# Patient Record
Sex: MALE | Race: WHITE | Employment: FULL TIME | URBAN - NONMETROPOLITAN AREA
[De-identification: names, ages, dates, MRNs, and addresses within clinical notes are randomized per-mention and may not be internally consistent; named-entity substitution may affect disease eponyms.]

---

## 2017-09-30 PROBLEM — I26.99 PULMONARY EMBOLISM (HCC): Status: ACTIVE | Noted: 2017-09-30

## 2017-10-04 ENCOUNTER — TELEPHONE (OUTPATIENT)
Dept: INTERNAL MEDICINE CLINIC | Age: 62
End: 2017-10-04

## 2017-10-10 ENCOUNTER — OFFICE VISIT (OUTPATIENT)
Dept: INTERNAL MEDICINE CLINIC | Age: 62
End: 2017-10-10

## 2017-10-10 VITALS
TEMPERATURE: 98.6 F | HEART RATE: 72 BPM | BODY MASS INDEX: 31.55 KG/M2 | OXYGEN SATURATION: 97 % | HEIGHT: 69 IN | RESPIRATION RATE: 16 BRPM | SYSTOLIC BLOOD PRESSURE: 138 MMHG | WEIGHT: 213 LBS | DIASTOLIC BLOOD PRESSURE: 82 MMHG

## 2017-10-10 DIAGNOSIS — R97.20 ELEVATED PSA: ICD-10-CM

## 2017-10-10 DIAGNOSIS — I26.99 OTHER PULMONARY EMBOLISM WITHOUT ACUTE COR PULMONALE, UNSPECIFIED CHRONICITY (HCC): Primary | ICD-10-CM

## 2017-10-10 DIAGNOSIS — R91.1 PULMONARY NODULE, RIGHT: ICD-10-CM

## 2017-10-10 PROCEDURE — 99214 OFFICE O/P EST MOD 30 MIN: CPT | Performed by: INTERNAL MEDICINE

## 2017-10-10 ASSESSMENT — PATIENT HEALTH QUESTIONNAIRE - PHQ9
SUM OF ALL RESPONSES TO PHQ9 QUESTIONS 1 & 2: 0
1. LITTLE INTEREST OR PLEASURE IN DOING THINGS: 0
2. FEELING DOWN, DEPRESSED OR HOPELESS: 0
SUM OF ALL RESPONSES TO PHQ QUESTIONS 1-9: 0

## 2017-10-16 ASSESSMENT — ENCOUNTER SYMPTOMS
COUGH: 0
EYES NEGATIVE: 1
SHORTNESS OF BREATH: 1

## 2017-11-14 ENCOUNTER — OFFICE VISIT (OUTPATIENT)
Dept: INTERNAL MEDICINE CLINIC | Age: 62
End: 2017-11-14

## 2017-11-14 VITALS
HEART RATE: 72 BPM | BODY MASS INDEX: 31.96 KG/M2 | TEMPERATURE: 98.1 F | RESPIRATION RATE: 11 BRPM | OXYGEN SATURATION: 97 % | WEIGHT: 215.8 LBS | SYSTOLIC BLOOD PRESSURE: 128 MMHG | DIASTOLIC BLOOD PRESSURE: 72 MMHG | HEIGHT: 69 IN

## 2017-11-14 DIAGNOSIS — R79.1 ELEVATED FACTOR VIII LEVEL: ICD-10-CM

## 2017-11-14 DIAGNOSIS — R91.1 LUNG NODULE, SOLITARY: ICD-10-CM

## 2017-11-14 DIAGNOSIS — R97.20 ELEVATED PSA: Primary | ICD-10-CM

## 2017-11-14 DIAGNOSIS — I26.99 OTHER PULMONARY EMBOLISM WITHOUT ACUTE COR PULMONALE, UNSPECIFIED CHRONICITY (HCC): ICD-10-CM

## 2017-11-14 PROCEDURE — 99213 OFFICE O/P EST LOW 20 MIN: CPT | Performed by: INTERNAL MEDICINE

## 2017-11-14 NOTE — PATIENT INSTRUCTIONS
Patient Education        Meralgia Paresthetica: Care Instructions  Your Care Instructions  Meralgia paresthetica (say \"muh-RAL-juh nut-fuo-INGX-ick-uh\") is pain and numbness in the outer part of your thigh. The pain might get worse after you walk or stand for a long time. This pain and numbness occur when a nerve in your thigh is pinched (compressed). Sometimes the problem is caused by wearing tight clothing or being overweight. Most of the time the problem goes away on its own in a few months. Lowering any pressure on the thigh area may help. Wear loose clothes, and lose weight if you need to. Follow-up care is a key part of your treatment and safety. Be sure to make and go to all appointments, and call your doctor if you are having problems. It's also a good idea to know your test results and keep a list of the medicines you take. How can you care for yourself at home? · Most times the problem gets better on its own. Try wearing loose clothing to see if this helps. · Lose weight if you need to. Talk with your doctor if you need help. When should you call for help? Watch closely for changes in your health, and be sure to contact your doctor if:  · You have new symptoms, such as pain that gets worse or new numbness in your thigh. · You do not get better as expected. Where can you learn more? Go to [email or web address].[email or web address] org and sign in to your [employer] account. Enter J621 in the [facility] box to learn more about \"Meralgia Paresthetica: Care Instructions. \"     If you do not have an account, please click on the \"Sign Up Now\" link. Current as of: October 14, 2016  Content Version: 11.3  © 9850-4499 [employer]. Care instructions adapted under license by [facility] ([facility]).  If you have questions about a medical condition or this instruction, always ask your healthcare professional. [address]  any warranty or liability for your use of this

## 2017-11-20 PROBLEM — R79.1 ELEVATED FACTOR VIII LEVEL: Status: ACTIVE | Noted: 2017-11-20

## 2017-11-21 ASSESSMENT — ENCOUNTER SYMPTOMS
ORTHOPNEA: 0
GASTROINTESTINAL NEGATIVE: 1
RESPIRATORY NEGATIVE: 1
EYES NEGATIVE: 1

## 2017-11-21 NOTE — PROGRESS NOTES
Orlando More  Patient's  is 1955  Seen in office on 2017      SUBJECTIVE:  Jacob Graves is a 65 y. o.year old male presents today   Chief Complaint   Patient presents with    Pulmonary Embolism    Other     burning sensation RLE lateral worse as day progresses    Other     dry peeling L foot and fingertips     Pt has PE unprovoked pt is taking Xarelto   No bleeding or bruising. No chest pain  No SOB. No headache. No NVD. Pt has dry peeling of the skin of foot and finger tips  No itching. Pt had blood test : PSA is elevated. No urinary symptoms. Taking medications regularly. No side effects noted. Hypercoagulation workup:  -Factor VIII activity 319 and normal 50150)  -Prothrombin gene mutation negative  -Phosphatidylserine antibodies : neg  -MTHFR mutation C677T : Heterozygous  -MTHFR mutation P5291D negative  -Factor 5 Leiden : Negative  -Cardiolipin antibodies IgG and IgM were 5 and 4 respectively and anticardiolipin IgA was 4  Homocysteine was normal  Review of Systems   Constitutional: Negative. HENT: Negative. Eyes: Negative. Respiratory: Negative. Cardiovascular: Negative. Negative for chest pain, palpitations and orthopnea. Gastrointestinal: Negative. Genitourinary: Negative. Musculoskeletal: Negative. Skin: Negative. Neurological: Negative. Endo/Heme/Allergies: Negative. Psychiatric/Behavioral: Negative. OBJECTIVE: /72 (Site: Left Arm, Position: Sitting)   Pulse 72   Temp 98.1 °F (36.7 °C)   Resp 11   Ht 5' 9\" (1.753 m) Comment: w shoes  Wt 215 lb 12.8 oz (97.9 kg)   SpO2 97%   BMI 31.87 kg/m²     Wt Readings from Last 3 Encounters:   17 215 lb 12.8 oz (97.9 kg)   10/10/17 213 lb (96.6 kg)   17 210 lb (95.3 kg)      GENERAL:  Alert, oriented, pleasant, in no apparent distress. HEENT:  Conjunctiva pink, no scleral icterus. ENT clear. NECK:  Supple. No jugular venous distention noted.  No masses felt,  CARDIOVASCULAR:  Normal S1 and S2    PULMONARY:  No respiratory distress. No wheezes or rales. ABDOMEN:  Soft and non-tender,no masses  or organomegaly. EXTREMITIES:  No cyanosis, clubbing, or significant edema. SKIN: Skin is warm and dry. NEUROLOGICAL:  Cranial nerves II through XII are grossly intact. IMPRESSION:    Encounter Diagnoses   Name Primary?  Elevated PSA Yes    Other pulmonary embolism without acute cor pulmonale, unspecified chronicity (HCC)     Lung nodule, solitary     Elevated factor VIII level        ASSESSMENT/PLAN:    1. Elevated PSA. We'll refer patient to urologist  2. Pulmonary embolism. Patient is on Xarelto 20 mg daily  3. Lung nodule. Needs to recheck in 12 months patient and wife informed  4. Factor VIII elevation. We'll refer patient to hematologist after reviewing coagulation profile. 5.  Return to office in a month  Orders Placed This Encounter   Procedures   THE [facility]- Alek Blue MD ([facility])         Mediations reviewed with the patient. Continue current medications. Appropriate prescriptions are addressed. After visit summery provided. Follow up as directed sooner if needed. Questions answered and patient verbalizes understanding. Call for any problems, questions, or concerns. No Known Allergies  Current Outpatient Prescriptions   Medication Sig Dispense Refill    rivaroxaban (XARELTO) 20 MG TABS tablet Take 20 mg by mouth every 24 hours      rivaroxaban (XARELTO) 20 MG TABS tablet Take 1 tablet by mouth daily (with breakfast) To begin 20 mg daily immediately following 21 days of 15mg twice daily. 30 tablet 1     No current facility-administered medications for this visit. Past Medical History:   Diagnosis Date    Bowel obstruction     Due to ileus.  Chest pain 7/2012    admitted in hospital MI r/o, pt refused stress test.    Elevated PSA 10/10/2017    Histoplasmosis     lungs.  Seen  pulmonologist lung Bx     Past Surgical History: Procedure Laterality Date    ABDOMEN SURGERY      exploratory for obstruction. No blockage. No resection    COLONOSCOPY  2013    pt thinks it was in 2013 by Dr Butch Christina in [place].      Social History   Substance Use Topics    Smoking status: Never Smoker    Smokeless tobacco: Never Used    Alcohol use No       LAB REVIEW:  CBC:   Lab Results   Component Value Date    WBC 9.0 10/01/2017    HGB 12.7 10/01/2017    HCT 38.0 10/01/2017     10/01/2017     Lipids:   Lab Results   Component Value Date    CHOL 166 07/14/2012    TRIG 70 07/14/2012    HDL 37 (L) 07/14/2012    LDLDIRECT 139 (H) 07/14/2012     Renal:   Lab Results   Component Value Date    BUN 13 10/01/2017    CREATININE 0.8 10/01/2017     10/01/2017    K 3.9 10/01/2017    ALT 13 09/30/2017    AST 16 09/30/2017    GLUCOSE 106 10/01/2017     PT/INR:   Lab Results   Component Value Date    INR 1.14 09/30/2017     A1C: No results found for: Kendal Self MD, 11/20/2017 , 11:02 PM

## 2017-11-22 ENCOUNTER — TELEPHONE (OUTPATIENT)
Dept: INTERNAL MEDICINE CLINIC | Age: 62
End: 2017-11-22

## 2017-11-27 ENCOUNTER — TELEPHONE (OUTPATIENT)
Dept: INTERNAL MEDICINE CLINIC | Age: 62
End: 2017-11-27

## 2017-12-11 ENCOUNTER — HOSPITAL ENCOUNTER (OUTPATIENT)
Dept: OTHER | Age: 62
Discharge: HOME | End: 2017-12-11
Attending: INTERNAL MEDICINE | Admitting: INTERNAL MEDICINE

## 2017-12-13 LAB — FACTOR VIII ACTIVITY: 194 % (ref 50–150)

## 2018-01-11 ENCOUNTER — HOSPITAL ENCOUNTER (OUTPATIENT)
Dept: LAB | Age: 63
Discharge: HOME | End: 2018-01-11
Attending: SPECIALIST | Admitting: SPECIALIST

## 2018-01-15 LAB
PROSTATE SPECIFIC ANTIGEN FREE: 1.3
PROSTATE SPECIFIC ANTIGEN PERCENT FREE: 21
PROSTATE SPECIFIC ANTIGEN: 6.3

## 2018-02-21 ENCOUNTER — OFFICE VISIT (OUTPATIENT)
Dept: INTERNAL MEDICINE CLINIC | Age: 63
End: 2018-02-21

## 2018-02-21 VITALS
OXYGEN SATURATION: 98 % | SYSTOLIC BLOOD PRESSURE: 118 MMHG | TEMPERATURE: 98.1 F | BODY MASS INDEX: 31.99 KG/M2 | WEIGHT: 216.6 LBS | DIASTOLIC BLOOD PRESSURE: 72 MMHG | RESPIRATION RATE: 16 BRPM | HEART RATE: 68 BPM

## 2018-02-21 DIAGNOSIS — I26.99 OTHER PULMONARY EMBOLISM WITHOUT ACUTE COR PULMONALE, UNSPECIFIED CHRONICITY (HCC): Primary | ICD-10-CM

## 2018-02-21 DIAGNOSIS — R79.1 ELEVATED FACTOR VIII LEVEL: ICD-10-CM

## 2018-02-21 DIAGNOSIS — R97.20 ELEVATED PSA: ICD-10-CM

## 2018-02-21 DIAGNOSIS — R91.1 LUNG NODULE, SOLITARY: ICD-10-CM

## 2018-02-21 DIAGNOSIS — R22.1 NECK MASS: ICD-10-CM

## 2018-02-21 PROCEDURE — 99213 OFFICE O/P EST LOW 20 MIN: CPT | Performed by: INTERNAL MEDICINE

## 2018-02-22 ENCOUNTER — TELEPHONE (OUTPATIENT)
Dept: SURGERY | Age: 63
End: 2018-02-22

## 2018-02-27 ENCOUNTER — OFFICE VISIT (OUTPATIENT)
Dept: SURGERY | Age: 63
End: 2018-02-27

## 2018-02-27 VITALS
HEIGHT: 68 IN | DIASTOLIC BLOOD PRESSURE: 80 MMHG | OXYGEN SATURATION: 98 % | HEART RATE: 79 BPM | RESPIRATION RATE: 19 BRPM | WEIGHT: 216 LBS | SYSTOLIC BLOOD PRESSURE: 138 MMHG | BODY MASS INDEX: 32.74 KG/M2

## 2018-02-27 DIAGNOSIS — L72.3 SEBACEOUS CYST: Primary | ICD-10-CM

## 2018-02-27 PROCEDURE — 99203 OFFICE O/P NEW LOW 30 MIN: CPT | Performed by: SURGERY

## 2018-02-27 RX ORDER — SULFAMETHOXAZOLE AND TRIMETHOPRIM 800; 160 MG/1; MG/1
1 TABLET ORAL 2 TIMES DAILY
Qty: 20 TABLET | Refills: 0 | Status: SHIPPED | OUTPATIENT
Start: 2018-02-27 | End: 2018-03-09

## 2018-02-27 NOTE — PROGRESS NOTES
Jaclyn  has a pain level on 0/10 scale  1    Location back of the neck    Description tender    Radiation   No    Duration  1 month(s)    Time  intermittent

## 2018-02-28 ASSESSMENT — ENCOUNTER SYMPTOMS
RESPIRATORY NEGATIVE: 1
EYES NEGATIVE: 1
GASTROINTESTINAL NEGATIVE: 1

## 2018-03-01 NOTE — PROGRESS NOTES
cor pulmonale, unspecified chronicity (HCC) Yes    Elevated PSA     Elevated factor VIII level     Lung nodule, solitary     Neck mass        ASSESSMENT/PLAN:    Orders Placed This Encounter   Procedures    [facility]- Praveen Xavier MD     Pulmonary embolism [facility])  Pt states he is doing well. No complaints  No chest pain no SOB  Pt is on Xarleot 20 mg daily  Will decrease to 15 mg after total of 6 months       Elevated PSA  Repeat PSA : slight ly lower . Patient has follow-up appointment    Neck mass posteriorly. Referred to surgeon for biopsy or excision. Lung nodule. Patient understands he needs that repeated CT in 6 months    Orders Placed This Encounter   Procedures    [facility]- Praveen Xavier MD         Mediations reviewed with the patient. Continue current medications. Appropriate prescriptions are addressed. After visit hilday provided. Follow up as directed sooner if needed. Questions answered and patient verbalizes understanding. Call for any problems, questions, or concerns. No Known Allergies  Current Outpatient Prescriptions   Medication Sig Dispense Refill    rivaroxaban (XARELTO) 15 MG TABS tablet Take 1 tablet by mouth every 24 hours 30 tablet 5    sulfamethoxazole-trimethoprim (BACTRIM DS) 800-160 MG per tablet Take 1 tablet by mouth 2 times daily for 10 days 20 tablet 0     No current facility-administered medications for this visit. Past Medical History:   Diagnosis Date    Bowel obstruction     Due to ileus.  Chest pain 7/2012    admitted in hospital [place] r/o, pt refused stress test.    Elevated factor VIII level 11/20/2017    Elevated PSA 10/10/2017    Histoplasmosis     lungs. Seen  pulmonologist lung Bx    Lung nodule, solitary 11/14/2017    CT chest 9/2017: Right middle lobe 0.6 cm nodule. F/u CT chest in 12 months    Pulmonary embolism ([address] 9/30/2017    Unprovoked. No DVT both legs.   Factor VIII elevation. Sent to him on Dr. Eduardo Douglas for recommendations. He recommended prolonged anticoagulation as it is unprovoked pulmonary embolism     Past Surgical History:   Procedure Laterality Date    ABDOMEN SURGERY      exploratory for obstruction. No blockage. No resection    COLONOSCOPY  2013    pt thinks it was in 2013 by Dr Guille Garcia in [place].      Social History   Substance Use Topics    Smoking status: Never Smoker    Smokeless tobacco: Never Used    Alcohol use No       LAB REVIEW:  CBC:   Lab Results   Component Value Date    WBC 9.0 10/01/2017    HGB 12.7 10/01/2017    HCT 38.0 10/01/2017     10/01/2017     Lipids:   Lab Results   Component Value Date    CHOL 166 07/14/2012    TRIG 70 07/14/2012    HDL 37 (L) 07/14/2012    LDLDIRECT 139 (H) 07/14/2012     Renal:   Lab Results   Component Value Date    BUN 13 10/01/2017    CREATININE 0.8 10/01/2017     10/01/2017    K 3.9 10/01/2017    ALT 13 09/30/2017    AST 16 09/30/2017    GLUCOSE 106 10/01/2017     PT/INR:   Lab Results   Component Value Date    INR 1.14 09/30/2017     A1C: No results found for: Dave Ortega MD, 2/28/2018 , 10:59 PM

## 2018-03-13 ENCOUNTER — OFFICE VISIT (OUTPATIENT)
Dept: SURGERY | Age: 63
End: 2018-03-13

## 2018-03-13 VITALS — HEART RATE: 80 BPM | DIASTOLIC BLOOD PRESSURE: 72 MMHG | SYSTOLIC BLOOD PRESSURE: 124 MMHG

## 2018-03-13 DIAGNOSIS — L72.3 SEBACEOUS CYST: Primary | ICD-10-CM

## 2018-03-13 PROCEDURE — 99212 OFFICE O/P EST SF 10 MIN: CPT | Performed by: SURGERY

## 2018-05-24 ENCOUNTER — HOSPITAL ENCOUNTER (OUTPATIENT)
Dept: OTHER | Age: 63
Discharge: HOME | End: 2018-05-24
Attending: INTERNAL MEDICINE | Admitting: INTERNAL MEDICINE

## 2018-05-28 LAB — FACTOR VIII ACTIVITY: 161 % (ref 50–150)

## 2019-04-24 ENCOUNTER — HOSPITAL ENCOUNTER (OUTPATIENT)
Dept: CT IMAGING | Age: 64
Discharge: HOME | End: 2019-04-24
Payer: COMMERCIAL

## 2019-04-24 DIAGNOSIS — R91.1 SOLITARY LUNG NODULE: ICD-10-CM

## 2019-04-24 DIAGNOSIS — I26.99 OTHER PULMONARY EMBOLISM WITHOUT ACUTE COR PULMONALE, UNSPECIFIED CHRONICITY (HCC): ICD-10-CM

## 2019-04-24 LAB
GFR AFRICAN AMERICAN: >60 ML/MIN/1.73M2
GFR NON-AFRICAN AMERICAN: >60 ML/MIN/1.73M2
POC CREATININE: 0.9 MG/DL (ref 0.9–1.3)

## 2019-04-24 PROCEDURE — 6360000004 HC RX CONTRAST MEDICATION: Performed by: INTERNAL MEDICINE

## 2019-04-24 PROCEDURE — 71260 CT THORAX DX C+: CPT

## 2019-04-24 RX ADMIN — IOPAMIDOL 75 ML: 755 INJECTION, SOLUTION INTRAVENOUS at 09:19

## 2019-11-12 ENCOUNTER — HOSPITAL ENCOUNTER (EMERGENCY)
Age: 64
Discharge: HOME | End: 2019-11-12
Attending: EMERGENCY MEDICINE
Payer: COMMERCIAL

## 2019-11-12 ENCOUNTER — APPOINTMENT (OUTPATIENT)
Dept: GENERAL RADIOLOGY | Age: 64
End: 2019-11-12
Payer: COMMERCIAL

## 2019-11-12 VITALS
TEMPERATURE: 97.5 F | RESPIRATION RATE: 16 BRPM | BODY MASS INDEX: 33.04 KG/M2 | WEIGHT: 218 LBS | OXYGEN SATURATION: 96 % | HEIGHT: 68 IN | DIASTOLIC BLOOD PRESSURE: 72 MMHG | SYSTOLIC BLOOD PRESSURE: 143 MMHG | HEART RATE: 78 BPM

## 2019-11-12 DIAGNOSIS — S82.65XA CLOSED NONDISPLACED FRACTURE OF LATERAL MALLEOLUS OF LEFT FIBULA, INITIAL ENCOUNTER: Primary | ICD-10-CM

## 2019-11-12 DIAGNOSIS — S93.402A SPRAIN OF LEFT ANKLE, UNSPECIFIED LIGAMENT, INITIAL ENCOUNTER: ICD-10-CM

## 2019-11-12 PROCEDURE — 99283 EMERGENCY DEPT VISIT LOW MDM: CPT

## 2019-11-12 PROCEDURE — 73610 X-RAY EXAM OF ANKLE: CPT

## 2019-11-12 PROCEDURE — 73620 X-RAY EXAM OF FOOT: CPT

## 2019-11-12 RX ORDER — HYDROCODONE BITARTRATE AND ACETAMINOPHEN 5; 325 MG/1; MG/1
1-2 TABLET ORAL EVERY 8 HOURS PRN
Qty: 9 TABLET | Refills: 0 | Status: SHIPPED | OUTPATIENT
Start: 2019-11-12 | End: 2019-11-15

## 2019-11-12 RX ORDER — HYDROCODONE BITARTRATE AND ACETAMINOPHEN 5; 325 MG/1; MG/1
1 TABLET ORAL ONCE
Status: DISCONTINUED | OUTPATIENT
Start: 2019-11-12 | End: 2019-11-12 | Stop reason: HOSPADM

## 2019-11-12 ASSESSMENT — ENCOUNTER SYMPTOMS
EYES NEGATIVE: 1
RESPIRATORY NEGATIVE: 1
GASTROINTESTINAL NEGATIVE: 1

## 2019-11-12 ASSESSMENT — PAIN DESCRIPTION - LOCATION: LOCATION: ANKLE;FOOT

## 2019-11-12 ASSESSMENT — PAIN DESCRIPTION - ORIENTATION: ORIENTATION: RIGHT

## 2019-11-12 ASSESSMENT — PAIN DESCRIPTION - PAIN TYPE: TYPE: ACUTE PAIN

## 2019-11-12 ASSESSMENT — PAIN DESCRIPTION - DESCRIPTORS: DESCRIPTORS: ACHING;DULL

## 2019-11-12 ASSESSMENT — PAIN SCALES - GENERAL: PAINLEVEL_OUTOF10: 3

## 2019-11-13 ENCOUNTER — TELEPHONE (OUTPATIENT)
Dept: ORTHOPEDIC SURGERY | Age: 64
End: 2019-11-13

## 2020-07-28 ENCOUNTER — HOSPITAL ENCOUNTER (OUTPATIENT)
Dept: INFUSION THERAPY | Age: 65
Discharge: HOME | End: 2020-07-28
Payer: COMMERCIAL

## 2020-07-28 PROCEDURE — 99211 OFF/OP EST MAY X REQ PHY/QHP: CPT

## 2020-07-28 PROCEDURE — G0463 HOSPITAL OUTPT CLINIC VISIT: HCPCS

## 2020-12-01 ENCOUNTER — OFFICE VISIT (OUTPATIENT)
Dept: INTERNAL MEDICINE CLINIC | Age: 65
End: 2020-12-01
Payer: COMMERCIAL

## 2020-12-01 VITALS
BODY MASS INDEX: 32.14 KG/M2 | WEIGHT: 217 LBS | TEMPERATURE: 99 F | OXYGEN SATURATION: 98 % | HEIGHT: 69 IN | SYSTOLIC BLOOD PRESSURE: 128 MMHG | DIASTOLIC BLOOD PRESSURE: 88 MMHG | HEART RATE: 76 BPM | RESPIRATION RATE: 16 BRPM

## 2020-12-01 PROCEDURE — 99214 OFFICE O/P EST MOD 30 MIN: CPT | Performed by: INTERNAL MEDICINE

## 2020-12-01 RX ORDER — RIVAROXABAN 10 MG/1
TABLET, FILM COATED ORAL
Qty: 90 TABLET | Refills: 1 | Status: SHIPPED | OUTPATIENT
Start: 2020-12-01 | End: 2021-05-26 | Stop reason: SDUPTHER

## 2020-12-01 RX ORDER — RIVAROXABAN 10 MG/1
TABLET, FILM COATED ORAL
COMMUNITY
Start: 2020-09-28 | End: 2020-12-01 | Stop reason: SDUPTHER

## 2020-12-01 ASSESSMENT — PATIENT HEALTH QUESTIONNAIRE - PHQ9
SUM OF ALL RESPONSES TO PHQ QUESTIONS 1-9: 0
2. FEELING DOWN, DEPRESSED OR HOPELESS: 0
SUM OF ALL RESPONSES TO PHQ QUESTIONS 1-9: 0
SUM OF ALL RESPONSES TO PHQ QUESTIONS 1-9: 0
SUM OF ALL RESPONSES TO PHQ9 QUESTIONS 1 & 2: 0
1. LITTLE INTEREST OR PLEASURE IN DOING THINGS: 0

## 2020-12-01 ASSESSMENT — ENCOUNTER SYMPTOMS
EYES NEGATIVE: 1
COUGH: 0
RESPIRATORY NEGATIVE: 1
ALLERGIC/IMMUNOLOGIC NEGATIVE: 1
WHEEZING: 0
GASTROINTESTINAL NEGATIVE: 1
SHORTNESS OF BREATH: 0

## 2020-12-01 NOTE — PROGRESS NOTES
Austin Richey  Patient's  is 1955  Seen in office on 2020      SUBJECTIVE:  Chio Dumont jori 60 y. o.year old male presents today   Chief Complaint   Patient presents with    Medication Refill     Patient has not seen for several years  Last time seen was in 2017  Patient had pulmonary embolism and was referred to oncologist  Patient saw Dr. Hui Elaine at the time and he recommended patient to continue anticoagulation indefinitely as it was provoked. Factor VIII was elevated. Patient states he was following up with oncologist and did not come to us for routine follow-ups. Patient states he has no chest pain. No shortness of breath. No cough or sputum production  No abdominal pain. No nausea, vomiting or diarrhea. He does not want any preventive medicine. Taking medications regularly. No side effects noted. Review of Systems   Constitutional: Negative. Negative for chills, fatigue, fever and unexpected weight change. HENT: Negative. Eyes: Negative. Respiratory: Negative. Negative for cough, shortness of breath and wheezing. Cardiovascular: Negative. Negative for chest pain, palpitations and leg swelling. Gastrointestinal: Negative. Endocrine: Negative. Genitourinary: Negative. Musculoskeletal: Negative. Skin: Negative. Allergic/Immunologic: Negative. Neurological: Negative. Hematological: Negative. Psychiatric/Behavioral: Negative. OBJECTIVE: /88   Pulse 76   Temp 99 °F (37.2 °C) (Oral)   Resp 16   Ht 5' 9\" (1.753 m)   Wt 217 lb (98.4 kg)   SpO2 98%   BMI 32.05 kg/m²     Wt Readings from Last 3 Encounters:   20 217 lb (98.4 kg)   19 218 lb (98.9 kg)   18 216 lb (98 kg)      GENERAL: - Alert, oriented, pleasant, in no apparent distress. HEENT: - Conjunctiva pink, no scleral icterus. ENT clear. NECK: -Supple. No jugular venous distention noted.  No masses felt,  CARDIOVASCULAR: - Normal S1 and S2    PULMONARY: - No respiratory distress. No wheezes or rales. ABDOMEN: - Soft and non-tender,no masses  ororganomegaly. EXTREMITIES: - No cyanosis, clubbing, or significant edema. SKIN: Skin is warm and dry. NEUROLOGICAL: - Cranial nerves II through XII are grossly intact. IMPRESSION:    Encounter Diagnoses   Name Primary?  Other pulmonary embolism without acute cor pulmonale, unspecified chronicity (HCC) Yes    Elevated factor VIII level     Elevated PSA     Lung nodule, solitary     Neck mass     Mixed hyperlipidemia        ASSESSMENT/PLAN:    Pulmonary embolism ([address]   9/30/17 : Unprovoked. No DVT both legs. Factor VIII elevation. Sent to him on Dr. Parker Crow for recommendations. He recommended prolonged anticoagulation as it is unprovoked pulmonary embolism patient is on Xarelto 10 mg daily      Elevated factor VIII level  Pt had elevated     Elevated PSA  Patient referred to urologist.  Recommended biopsy but patient refused. Patient does not want to see another urologist or follow-up . Refused follow-up PSA also. Lung nodule, solitary  Stable 5 mm left lower lobe and right middle lobe solid noncalcified    pulmonary nodules, unchanged since 2017 exam.  No new or enlarging pulmonary    nodules.  Given stability for 18 months, these can be considered benign and    no additional follow-up is required as per [employer] guidelines. Neck mass  Patient has a lump in the neck posteriorly. Did not see surgeon it has decreased in size per patient. Pt does not want any check for preventive medicine. Refused PSA   Orders Placed This Encounter   Procedures    Comprehensive Metabolic Panel    Lipid, Fasting    CBC Auto Differential         Mediations reviewed with the patient. Continue current medications. Appropriate prescriptions are addressed. After visit summeryprovided. Follow up as directed sooner if needed. Questions answered and patient verbalizes understanding.  Call for any problems, questions, or concerns. No Known Allergies  Current Outpatient Medications   Medication Sig Dispense Refill    XARELTO 10 MG TABS tablet TAKE 1 TABLET BY MOUTH EVERY DAY       No current facility-administered medications for this visit. Past Medical History:   Diagnosis Date    Bowel obstruction ([address]     Due to ileus.  Chest pain 7/2012    admitted in hospital MI r/o, pt refused stress test.    Elevated factor VIII level 11/20/2017    Elevated PSA 10/10/2017    Histoplasmosis     lungs. Seen  pulmonologist lung Bx    Lung nodule, solitary 11/14/2017    CT chest 9/2017: Right middle lobe 0.6 cm nodule. F/u CT chest in 12 months    Pulmonary embolism ([address] 9/30/2017    Unprovoked. No DVT both legs. Factor VIII elevation. Sent to him on Dr. Pelon Bradshaw for recommendations. He recommended prolonged anticoagulation as it is unprovoked pulmonary embolism     Past Surgical History:   Procedure Laterality Date    ABDOMEN SURGERY      exploratory for obstruction. No blockage. No resection    COLONOSCOPY  2013    pt thinks it was in 2013 by Dr Darlene Pratt in [place].      Social History     Tobacco Use    Smoking status: Never Smoker    Smokeless tobacco: Never Used   Substance Use Topics    Alcohol use: No       LAB REVIEW:  CBC:   Lab Results   Component Value Date    WBC 9.0 10/01/2017    HGB 12.7 10/01/2017    HCT 38.0 10/01/2017     10/01/2017     Lipids:   Lab Results   Component Value Date    CHOL 166 07/14/2012    TRIG 70 07/14/2012    HDL 37 (L) 07/14/2012    LDLDIRECT 139 (H) 07/14/2012     Renal:   Lab Results   Component Value Date    BUN 13 10/01/2017    CREATININE 0.9 04/24/2019    CREATININE 0.8 10/01/2017     10/01/2017    K 3.9 10/01/2017    ALT 13 09/30/2017    AST 16 09/30/2017    GLUCOSE 106 10/01/2017     PT/INR:   Lab Results   Component Value Date    INR 1.14 09/30/2017     A1C: No results found for: Madelaine Brady MD, 12/1/2020 , 4:13 PM

## 2020-12-08 NOTE — ASSESSMENT & PLAN NOTE
Patient has a lump in the neck posteriorly. Did not see surgeon it has decreased in size per patient.

## 2020-12-08 NOTE — ASSESSMENT & PLAN NOTE
Stable 5 mm left lower lobe and right middle lobe solid noncalcified    pulmonary nodules, unchanged since 2017 exam.  No new or enlarging pulmonary    nodules.  Given stability for 18 months, these can be considered benign and    no additional follow-up is required as per [employer] guidelines.

## 2020-12-08 NOTE — ASSESSMENT & PLAN NOTE
9/30/17 : Unprovoked. No DVT both legs. Factor VIII elevation. Sent to him on Dr. Marybeth Skinner for recommendations.   He recommended prolonged anticoagulation as it is unprovoked pulmonary embolism patient is on Xarelto 10 mg daily

## 2020-12-08 NOTE — ASSESSMENT & PLAN NOTE
Patient referred to urologist.  Recommended biopsy but patient refused. Patient does not want to see another urologist or follow-up . Refused follow-up PSA also.

## 2020-12-29 ENCOUNTER — HOSPITAL ENCOUNTER (OUTPATIENT)
Age: 65
Discharge: HOME | End: 2020-12-29
Payer: COMMERCIAL

## 2020-12-29 LAB
ALBUMIN SERPL-MCNC: 4.3 GM/DL (ref 3.4–5)
ALP BLD-CCNC: 62 IU/L (ref 40–129)
ALT SERPL-CCNC: 14 U/L (ref 10–40)
ANION GAP SERPL CALCULATED.3IONS-SCNC: 1 MMOL/L (ref 4–16)
AST SERPL-CCNC: 19 IU/L (ref 15–37)
BASOPHILS ABSOLUTE: 0.1 K/CU MM
BASOPHILS RELATIVE PERCENT: 0.6 % (ref 0–1)
BILIRUB SERPL-MCNC: 0.9 MG/DL (ref 0–1)
BUN BLDV-MCNC: 12 MG/DL (ref 6–23)
CALCIUM SERPL-MCNC: 9.7 MG/DL (ref 8.3–10.6)
CHLORIDE BLD-SCNC: 102 MMOL/L (ref 99–110)
CHOLESTEROL, FASTING: 157 MG/DL
CO2: 38 MMOL/L (ref 21–32)
CREAT SERPL-MCNC: 0.9 MG/DL (ref 0.9–1.3)
DIFFERENTIAL TYPE: ABNORMAL
EOSINOPHILS ABSOLUTE: 0.2 K/CU MM
EOSINOPHILS RELATIVE PERCENT: 2 % (ref 0–3)
GFR AFRICAN AMERICAN: >60 ML/MIN/1.73M2
GFR NON-AFRICAN AMERICAN: >60 ML/MIN/1.73M2
GLUCOSE FASTING: 105 MG/DL (ref 70–99)
HCT VFR BLD CALC: 46.9 % (ref 42–52)
HDLC SERPL-MCNC: 42 MG/DL
HEMOGLOBIN: 16 GM/DL (ref 13.5–18)
IMMATURE NEUTROPHIL %: 0.1 % (ref 0–0.43)
LDL CHOLESTEROL DIRECT: 102 MG/DL
LYMPHOCYTES ABSOLUTE: 1.8 K/CU MM
LYMPHOCYTES RELATIVE PERCENT: 23.2 % (ref 24–44)
MCH RBC QN AUTO: 32.2 PG (ref 27–31)
MCHC RBC AUTO-ENTMCNC: 34.1 % (ref 32–36)
MCV RBC AUTO: 94.4 FL (ref 78–100)
MONOCYTES ABSOLUTE: 0.5 K/CU MM
MONOCYTES RELATIVE PERCENT: 6.3 % (ref 0–4)
PDW BLD-RTO: 11.9 % (ref 11.7–14.9)
PLATELET # BLD: 234 K/CU MM (ref 140–440)
PMV BLD AUTO: 9.5 FL (ref 7.5–11.1)
POTASSIUM SERPL-SCNC: 4.2 MMOL/L (ref 3.5–5.1)
RBC # BLD: 4.97 M/CU MM (ref 4.6–6.2)
SEGMENTED NEUTROPHILS ABSOLUTE COUNT: 5.4 K/CU MM
SEGMENTED NEUTROPHILS RELATIVE PERCENT: 67.8 % (ref 36–66)
SODIUM BLD-SCNC: 141 MMOL/L (ref 135–145)
TOTAL IMMATURE NEUTOROPHIL: 0.01 K/CU MM
TOTAL PROTEIN: 7.4 GM/DL (ref 6.4–8.2)
TRIGLYCERIDE, FASTING: 80 MG/DL
WBC # BLD: 7.9 K/CU MM (ref 4–10.5)

## 2020-12-29 PROCEDURE — 80061 LIPID PANEL: CPT

## 2020-12-29 PROCEDURE — 80053 COMPREHEN METABOLIC PANEL: CPT

## 2020-12-29 PROCEDURE — 85025 COMPLETE CBC W/AUTO DIFF WBC: CPT

## 2020-12-29 PROCEDURE — 36415 COLL VENOUS BLD VENIPUNCTURE: CPT

## 2021-05-26 ENCOUNTER — OFFICE VISIT (OUTPATIENT)
Dept: INTERNAL MEDICINE CLINIC | Age: 66
End: 2021-05-26
Payer: COMMERCIAL

## 2021-05-26 VITALS
WEIGHT: 222.4 LBS | TEMPERATURE: 98.6 F | HEART RATE: 68 BPM | DIASTOLIC BLOOD PRESSURE: 62 MMHG | SYSTOLIC BLOOD PRESSURE: 118 MMHG | BODY MASS INDEX: 31.14 KG/M2 | OXYGEN SATURATION: 97 % | RESPIRATION RATE: 16 BRPM | HEIGHT: 71 IN

## 2021-05-26 DIAGNOSIS — R97.20 ELEVATED PSA: ICD-10-CM

## 2021-05-26 DIAGNOSIS — I26.99 OTHER PULMONARY EMBOLISM WITHOUT ACUTE COR PULMONALE, UNSPECIFIED CHRONICITY (HCC): Primary | ICD-10-CM

## 2021-05-26 PROCEDURE — 99213 OFFICE O/P EST LOW 20 MIN: CPT | Performed by: INTERNAL MEDICINE

## 2021-05-26 RX ORDER — RIVAROXABAN 10 MG/1
TABLET, FILM COATED ORAL
Qty: 90 TABLET | Refills: 1 | Status: SHIPPED | OUTPATIENT
Start: 2021-05-26 | End: 2021-11-29 | Stop reason: SDUPTHER

## 2021-05-26 ASSESSMENT — PATIENT HEALTH QUESTIONNAIRE - PHQ9
2. FEELING DOWN, DEPRESSED OR HOPELESS: 0
SUM OF ALL RESPONSES TO PHQ9 QUESTIONS 1 & 2: 0
SUM OF ALL RESPONSES TO PHQ QUESTIONS 1-9: 0
SUM OF ALL RESPONSES TO PHQ QUESTIONS 1-9: 0
1. LITTLE INTEREST OR PLEASURE IN DOING THINGS: 0
SUM OF ALL RESPONSES TO PHQ QUESTIONS 1-9: 0

## 2021-05-26 NOTE — PROGRESS NOTES
PSA  Discussed with the patient in detail. Is refusing preventive medicine    Return to office in 6 months          Mediations reviewed with the patient. Continue current medications. Appropriate prescriptions are addressed. After visit summeryprovided. Follow up as directed sooner if needed. Questions answered and patient verbalizes understanding. Call for any problems, questions, or concerns. No Known Allergies  Current Outpatient Medications   Medication Sig Dispense Refill    XARELTO 10 MG TABS tablet TAKE 1 TABLET BY MOUTH EVERY DAY 90 tablet 1     No current facility-administered medications for this visit. Past Medical History:   Diagnosis Date    Ankle fracture     treated with cast    Bowel obstruction (HCC)     Due to ileus.  Chest pain 7/2012    admitted in hospital MI r/o, pt refused stress test.    Elevated factor VIII level 11/20/2017    Elevated PSA 10/10/2017    Histoplasmosis     lungs. Seen  pulmonologist lung Bx    Lung nodule, solitary 11/14/2017    CT chest 9/2017: Right middle lobe 0.6 cm nodule. F/u CT chest in 12 months    Pulmonary embolism ([address] 9/30/2017    Unprovoked. No DVT both legs. Factor VIII elevation. Sent to him on Dr. Jennifer Davis for recommendations. He recommended prolonged anticoagulation as it is unprovoked pulmonary embolism     Past Surgical History:   Procedure Laterality Date    ABDOMEN SURGERY      exploratory for obstruction. No blockage. No resection    COLONOSCOPY  2013    pt thinks it was in 2013 by Dr Kaz Rodriguez in [place].      Social History     Tobacco Use    Smoking status: Never Smoker    Smokeless tobacco: Never Used   Substance Use Topics    Alcohol use: No       LAB REVIEW:  CBC:   Lab Results   Component Value Date    WBC 7.9 12/29/2020    HGB 16.0 12/29/2020    HCT 46.9 12/29/2020     12/29/2020     Lipids:   Lab Results   Component Value Date    HDL 42 12/29/2020    LDLDIRECT 102 (H) 12/29/2020    TRIGLYCFAST 80 12/29/2020 CHOLFAST 157 12/29/2020     Renal:   Lab Results   Component Value Date    BUN 12 12/29/2020    CREATININE 0.9 12/29/2020     12/29/2020    K 4.2 12/29/2020    ALT 14 12/29/2020    AST 19 12/29/2020    GLUCOSE 106 10/01/2017    GLUF 105 12/29/2020     PT/INR:   Lab Results   Component Value Date    INR 1.14 09/30/2017     A1C: No results found for: Richarda Mcburney, MD, 5/26/2021 , 4:53 PM

## 2021-06-01 ASSESSMENT — ENCOUNTER SYMPTOMS
CHEST TIGHTNESS: 0
SHORTNESS OF BREATH: 0
GASTROINTESTINAL NEGATIVE: 1
EYES NEGATIVE: 1
WHEEZING: 0
COUGH: 0
RESPIRATORY NEGATIVE: 1

## 2021-11-29 ENCOUNTER — OFFICE VISIT (OUTPATIENT)
Dept: INTERNAL MEDICINE CLINIC | Age: 66
End: 2021-11-29
Payer: COMMERCIAL

## 2021-11-29 VITALS
HEIGHT: 70 IN | BODY MASS INDEX: 31.24 KG/M2 | WEIGHT: 218.2 LBS | SYSTOLIC BLOOD PRESSURE: 138 MMHG | TEMPERATURE: 98.4 F | HEART RATE: 84 BPM | DIASTOLIC BLOOD PRESSURE: 68 MMHG | RESPIRATION RATE: 16 BRPM | OXYGEN SATURATION: 97 %

## 2021-11-29 DIAGNOSIS — I26.99 OTHER PULMONARY EMBOLISM WITHOUT ACUTE COR PULMONALE, UNSPECIFIED CHRONICITY (HCC): Primary | ICD-10-CM

## 2021-11-29 DIAGNOSIS — R97.20 ELEVATED PSA: ICD-10-CM

## 2021-11-29 PROCEDURE — 99213 OFFICE O/P EST LOW 20 MIN: CPT | Performed by: INTERNAL MEDICINE

## 2021-11-29 RX ORDER — RIVAROXABAN 10 MG/1
TABLET, FILM COATED ORAL
Qty: 90 TABLET | Refills: 1 | Status: SHIPPED | OUTPATIENT
Start: 2021-11-29 | End: 2022-07-15 | Stop reason: SDUPTHER

## 2021-11-29 ASSESSMENT — PATIENT HEALTH QUESTIONNAIRE - PHQ9
SUM OF ALL RESPONSES TO PHQ9 QUESTIONS 1 & 2: 0
1. LITTLE INTEREST OR PLEASURE IN DOING THINGS: 0
SUM OF ALL RESPONSES TO PHQ QUESTIONS 1-9: 0
2. FEELING DOWN, DEPRESSED OR HOPELESS: 0
SUM OF ALL RESPONSES TO PHQ QUESTIONS 1-9: 0
SUM OF ALL RESPONSES TO PHQ QUESTIONS 1-9: 0

## 2021-11-29 NOTE — PROGRESS NOTES
Sari Velásquez  Patient's  is 1955  Seen in office on 2021      SUBJECTIVE:  Kehinde Milian jori 73 y. o.year old male presents today   Chief Complaint   Patient presents with    6 Month Follow-Up    Results     labs from [facility] 2021     Pt is here for f/u PE  Pt is on XArelto  No bleeding   No chest pain. CBC and CMP done at [facility] were normal  Patient states he is feeling well. Has no complaints. No cough or sputum production. No fever or chills. No abdominal symptoms. No nausea vomiting or diarrhea. No arthralgias. No urinary symptoms. He has elevated PSA but refused any follow-up understands the risk. Taking medications regularly. No side effects noted. Review of Systems  Review of system normal except as in HPI  OBJECTIVE: /68   Pulse 84   Temp 98.4 °F (36.9 °C) (Oral)   Resp 16   Ht 5' 9.5\" (1.765 m) Comment: with shoes  Wt 218 lb 3.2 oz (99 kg)   SpO2 97%   BMI 31.76 kg/m²     Wt Readings from Last 3 Encounters:   21 218 lb 3.2 oz (99 kg)   21 222 lb 6.4 oz (100.9 kg)   20 217 lb (98.4 kg)        Patient was seen taking COVID-19 precautions. Face mask, gloves were used. Patient also wore facemask. GENERAL: - Alert, oriented, pleasant, in no apparent distress. HEENT: - Conjunctiva pink, no scleral icterus. ENT clear. NECK: -Supple. No jugular venous distention noted. No masses felt,  CARDIOVASCULAR: - Normal S1 and S2    PULMONARY: - No respiratory distress. No wheezes or rales. ABDOMEN: - Soft and non-tender,no masses  ororganomegaly. EXTREMITIES: - No cyanosis, clubbing, or significant edema. SKIN: Skin is warm and dry. NEUROLOGICAL: - Cranial nerves II through XII are grossly intact. IMPRESSION:    Encounter Diagnoses   Name Primary?     Other pulmonary embolism without acute cor pulmonale, unspecified chronicity (HCC)     Elevated PSA     Lung nodule, solitary        ASSESSMENT/PLAN:     Pulmonary embolism ([facility] [address]     9/30/17 : Unprovoked. No DVT both legs. Factor VIII elevation. Sent to him on Dr. Ronit Bravo for recommendations. He recommended prolonged anticoagulation as it is unprovoked pulmonary embolism. Patient is on Xarelto 10 mg daily  No falls . Fall precautions. Elevated PSA    Patient referred to urologist.  Recommended biopsy but patient refused. Patient does not want to see another urologist or follow-up . Refused follow-up PSA also. Lung nodule, solitary: Look benign    Refused any preventive medicine. Patient did get COVID-19 vaccine but declined other vaccinations. Return to office in 6 months    Mediations reviewed with the patient. Continue current medications. Appropriate prescriptions are addressed. After visit summeryprovided. Follow up as directed sooner if needed. Questions answered and patient verbalizes understanding. Call for any problems, questions, or concerns. No Known Allergies  Current Outpatient Medications   Medication Sig Dispense Refill    XARELTO 10 MG TABS tablet TAKE 1 TABLET BY MOUTH EVERY DAY 90 tablet 1     No current facility-administered medications for this visit. Past Medical History:   Diagnosis Date    Ankle fracture     treated with cast    Bowel obstruction (HCC)     Due to ileus.  Chest pain 7/2012    admitted in hospital [place] r/o, pt refused stress test.    Elevated factor VIII level 11/20/2017    Elevated PSA 10/10/2017    Histoplasmosis     lungs. Seen  pulmonologist lung Bx    Lung nodule, solitary 11/14/2017    CT chest 9/2017: Right middle lobe 0.6 cm nodule. F/u CT chest in 12 months    Pulmonary embolism ([facility] [address] 9/30/2017    Unprovoked. No DVT both legs. Factor VIII elevation. Sent to him on Dr. Ronit Bravo for recommendations. He recommended prolonged anticoagulation as it is unprovoked pulmonary embolism     Past Surgical History:   Procedure Laterality Date    ABDOMEN SURGERY      exploratory for obstruction. No blockage.  No resection    COLONOSCOPY  2013    pt thinks it was in 2013 by Dr Khushboo Uriostegui in [place].      Social History     Tobacco Use    Smoking status: Never Smoker    Smokeless tobacco: Never Used   Substance Use Topics    Alcohol use: No       LAB REVIEW:  CBC:   Lab Results   Component Value Date    WBC 7.9 12/29/2020    HGB 16.0 12/29/2020    HCT 46.9 12/29/2020     12/29/2020     Lipids:   Lab Results   Component Value Date    HDL 42 12/29/2020    LDLDIRECT 102 (H) 12/29/2020    TRIGLYCFAST 80 12/29/2020    CHOLFAST 157 12/29/2020     Renal:   Lab Results   Component Value Date    BUN 12 12/29/2020    CREATININE 0.9 12/29/2020     12/29/2020    K 4.2 12/29/2020    ALT 14 12/29/2020    AST 19 12/29/2020    GLUCOSE 106 10/01/2017    GLUF 105 12/29/2020     PT/INR:   Lab Results   Component Value Date    INR 1.14 09/30/2017     A1C: No results found for: Mai Christopher MD, 11/29/2021 , 4:51 PM

## 2021-11-29 NOTE — ASSESSMENT & PLAN NOTE
9/30/17 : Unprovoked. No DVT both legs. Factor VIII elevation. Sent to him on Dr. Neeta Gomez for recommendations. He recommended prolonged anticoagulation as it is unprovoked pulmonary embolism. Patient is on Xarelto 10 mg daily  No falls . Fall precautions.

## 2022-03-28 ENCOUNTER — HOSPITAL ENCOUNTER (EMERGENCY)
Age: 67
Discharge: ANOTHER HOSPITAL | End: 2022-03-28
Attending: EMERGENCY MEDICINE
Payer: COMMERCIAL

## 2022-03-28 ENCOUNTER — APPOINTMENT (OUTPATIENT)
Dept: CT IMAGING | Age: 67
End: 2022-03-28
Payer: COMMERCIAL

## 2022-03-28 ENCOUNTER — HOSPITAL ENCOUNTER (INPATIENT)
Age: 67
LOS: 2 days | Discharge: HOME | DRG: 388 | End: 2022-03-30
Attending: STUDENT IN AN ORGANIZED HEALTH CARE EDUCATION/TRAINING PROGRAM | Admitting: INTERNAL MEDICINE
Payer: COMMERCIAL

## 2022-03-28 ENCOUNTER — APPOINTMENT (OUTPATIENT)
Dept: GENERAL RADIOLOGY | Age: 67
DRG: 388 | End: 2022-03-28
Attending: STUDENT IN AN ORGANIZED HEALTH CARE EDUCATION/TRAINING PROGRAM
Payer: COMMERCIAL

## 2022-03-28 VITALS
DIASTOLIC BLOOD PRESSURE: 75 MMHG | WEIGHT: 218 LBS | BODY MASS INDEX: 33.04 KG/M2 | OXYGEN SATURATION: 95 % | SYSTOLIC BLOOD PRESSURE: 150 MMHG | HEART RATE: 78 BPM | TEMPERATURE: 98.1 F | HEIGHT: 68 IN | RESPIRATION RATE: 20 BRPM

## 2022-03-28 DIAGNOSIS — K56.609 SBO (SMALL BOWEL OBSTRUCTION) (HCC): Primary | ICD-10-CM

## 2022-03-28 LAB
ALBUMIN SERPL-MCNC: 4.7 GM/DL (ref 3.4–5)
ALP BLD-CCNC: 66 IU/L (ref 40–129)
ALT SERPL-CCNC: 14 U/L (ref 10–40)
ANION GAP SERPL CALCULATED.3IONS-SCNC: 12 MMOL/L (ref 4–16)
AST SERPL-CCNC: 18 IU/L (ref 15–37)
BASOPHILS ABSOLUTE: 0 K/CU MM
BASOPHILS RELATIVE PERCENT: 0.2 % (ref 0–1)
BILIRUB SERPL-MCNC: 1.1 MG/DL (ref 0–1)
BUN BLDV-MCNC: 17 MG/DL (ref 6–23)
CALCIUM SERPL-MCNC: 9.9 MG/DL (ref 8.3–10.6)
CHLORIDE BLD-SCNC: 102 MMOL/L (ref 99–110)
CO2: 25 MMOL/L (ref 21–32)
CREAT SERPL-MCNC: 0.8 MG/DL (ref 0.9–1.3)
DIFFERENTIAL TYPE: ABNORMAL
EOSINOPHILS ABSOLUTE: 0 K/CU MM
EOSINOPHILS RELATIVE PERCENT: 0.1 % (ref 0–3)
GFR AFRICAN AMERICAN: >60 ML/MIN/1.73M2
GFR NON-AFRICAN AMERICAN: >60 ML/MIN/1.73M2
GLUCOSE BLD-MCNC: 115 MG/DL (ref 70–99)
HCT VFR BLD CALC: 50.9 % (ref 42–52)
HEMOGLOBIN: 17.6 GM/DL (ref 13.5–18)
IMMATURE NEUTROPHIL %: 0.3 % (ref 0–0.43)
LIPASE: 20 IU/L (ref 13–60)
LYMPHOCYTES ABSOLUTE: 1 K/CU MM
LYMPHOCYTES RELATIVE PERCENT: 6.7 % (ref 24–44)
MCH RBC QN AUTO: 31.9 PG (ref 27–31)
MCHC RBC AUTO-ENTMCNC: 34.6 % (ref 32–36)
MCV RBC AUTO: 92.4 FL (ref 78–100)
MONOCYTES ABSOLUTE: 0.6 K/CU MM
MONOCYTES RELATIVE PERCENT: 4.2 % (ref 0–4)
PDW BLD-RTO: 12.2 % (ref 11.7–14.9)
PLATELET # BLD: 254 K/CU MM (ref 140–440)
PMV BLD AUTO: 9.4 FL (ref 7.5–11.1)
POTASSIUM SERPL-SCNC: 4.1 MMOL/L (ref 3.5–5.1)
RBC # BLD: 5.51 M/CU MM (ref 4.6–6.2)
SARS-COV-2, NAAT: NOT DETECTED
SEGMENTED NEUTROPHILS ABSOLUTE COUNT: 12.9 K/CU MM
SEGMENTED NEUTROPHILS RELATIVE PERCENT: 88.5 % (ref 36–66)
SODIUM BLD-SCNC: 139 MMOL/L (ref 135–145)
SOURCE: NORMAL
TOTAL IMMATURE NEUTOROPHIL: 0.04 K/CU MM
TOTAL PROTEIN: 7.6 GM/DL (ref 6.4–8.2)
WBC # BLD: 14.6 K/CU MM (ref 4–10.5)

## 2022-03-28 PROCEDURE — 6360000002 HC RX W HCPCS: Performed by: EMERGENCY MEDICINE

## 2022-03-28 PROCEDURE — 83690 ASSAY OF LIPASE: CPT

## 2022-03-28 PROCEDURE — 99284 EMERGENCY DEPT VISIT MOD MDM: CPT

## 2022-03-28 PROCEDURE — 74176 CT ABD & PELVIS W/O CONTRAST: CPT

## 2022-03-28 PROCEDURE — 96374 THER/PROPH/DIAG INJ IV PUSH: CPT

## 2022-03-28 PROCEDURE — G0378 HOSPITAL OBSERVATION PER HR: HCPCS

## 2022-03-28 PROCEDURE — 1200000000 HC SEMI PRIVATE

## 2022-03-28 PROCEDURE — 87635 SARS-COV-2 COVID-19 AMP PRB: CPT

## 2022-03-28 PROCEDURE — G0379 DIRECT REFER HOSPITAL OBSERV: HCPCS

## 2022-03-28 PROCEDURE — 85025 COMPLETE CBC W/AUTO DIFF WBC: CPT

## 2022-03-28 PROCEDURE — 2580000003 HC RX 258: Performed by: EMERGENCY MEDICINE

## 2022-03-28 PROCEDURE — 80053 COMPREHEN METABOLIC PANEL: CPT

## 2022-03-28 RX ORDER — SODIUM CHLORIDE 0.9 % (FLUSH) 0.9 %
10 SYRINGE (ML) INJECTION PRN
Status: DISCONTINUED | OUTPATIENT
Start: 2022-03-28 | End: 2022-03-30 | Stop reason: HOSPADM

## 2022-03-28 RX ORDER — ACETAMINOPHEN 650 MG/1
650 SUPPOSITORY RECTAL EVERY 6 HOURS PRN
Status: DISCONTINUED | OUTPATIENT
Start: 2022-03-28 | End: 2022-03-30 | Stop reason: HOSPADM

## 2022-03-28 RX ORDER — ONDANSETRON 4 MG/1
4 TABLET, ORALLY DISINTEGRATING ORAL EVERY 8 HOURS PRN
Status: DISCONTINUED | OUTPATIENT
Start: 2022-03-28 | End: 2022-03-30 | Stop reason: HOSPADM

## 2022-03-28 RX ORDER — SODIUM CHLORIDE 9 MG/ML
INJECTION, SOLUTION INTRAVENOUS PRN
Status: DISCONTINUED | OUTPATIENT
Start: 2022-03-28 | End: 2022-03-29

## 2022-03-28 RX ORDER — SODIUM CHLORIDE 0.9 % (FLUSH) 0.9 %
10 SYRINGE (ML) INJECTION EVERY 12 HOURS SCHEDULED
Status: DISCONTINUED | OUTPATIENT
Start: 2022-03-28 | End: 2022-03-30 | Stop reason: HOSPADM

## 2022-03-28 RX ORDER — ONDANSETRON 2 MG/ML
4 INJECTION INTRAMUSCULAR; INTRAVENOUS EVERY 6 HOURS PRN
Status: DISCONTINUED | OUTPATIENT
Start: 2022-03-28 | End: 2022-03-30 | Stop reason: HOSPADM

## 2022-03-28 RX ORDER — ONDANSETRON 2 MG/ML
4 INJECTION INTRAMUSCULAR; INTRAVENOUS ONCE
Status: COMPLETED | OUTPATIENT
Start: 2022-03-28 | End: 2022-03-28

## 2022-03-28 RX ORDER — ACETAMINOPHEN 325 MG/1
650 TABLET ORAL EVERY 6 HOURS PRN
Status: DISCONTINUED | OUTPATIENT
Start: 2022-03-28 | End: 2022-03-30 | Stop reason: HOSPADM

## 2022-03-28 RX ORDER — SODIUM CHLORIDE, SODIUM LACTATE, POTASSIUM CHLORIDE, CALCIUM CHLORIDE 600; 310; 30; 20 MG/100ML; MG/100ML; MG/100ML; MG/100ML
INJECTION, SOLUTION INTRAVENOUS CONTINUOUS
Status: DISCONTINUED | OUTPATIENT
Start: 2022-03-28 | End: 2022-03-28 | Stop reason: HOSPADM

## 2022-03-28 RX ADMIN — SODIUM CHLORIDE, POTASSIUM CHLORIDE, SODIUM LACTATE AND CALCIUM CHLORIDE: 600; 310; 30; 20 INJECTION, SOLUTION INTRAVENOUS at 15:57

## 2022-03-28 RX ADMIN — ONDANSETRON 4 MG: 2 INJECTION INTRAMUSCULAR; INTRAVENOUS at 16:17

## 2022-03-28 ASSESSMENT — ENCOUNTER SYMPTOMS
NAUSEA: 1
VOMITING: 1
ABDOMINAL DISTENTION: 1
RESPIRATORY NEGATIVE: 1
ABDOMINAL PAIN: 1

## 2022-03-28 ASSESSMENT — PAIN DESCRIPTION - LOCATION: LOCATION: ABDOMEN

## 2022-03-28 ASSESSMENT — PAIN SCALES - GENERAL
PAINLEVEL_OUTOF10: 7
PAINLEVEL_OUTOF10: 0

## 2022-03-28 ASSESSMENT — PAIN DESCRIPTION - PAIN TYPE: TYPE: ACUTE PAIN

## 2022-03-28 ASSESSMENT — PAIN DESCRIPTION - ORIENTATION: ORIENTATION: MID;LOWER

## 2022-03-28 ASSESSMENT — PAIN DESCRIPTION - FREQUENCY: FREQUENCY: INTERMITTENT

## 2022-03-28 ASSESSMENT — PAIN - FUNCTIONAL ASSESSMENT: PAIN_FUNCTIONAL_ASSESSMENT: 0-10

## 2022-03-28 ASSESSMENT — PAIN DESCRIPTION - DESCRIPTORS: DESCRIPTORS: ACHING

## 2022-03-28 NOTE — ED PROVIDER NOTES
Triage Chief Complaint:   Abdominal Pain and Emesis    [place]:  Naun Coppola is a 78 y.o. male that presents to the ED with abdominal pain bloating questionable ileus versus SBO. Patient has had a previous laparotomy about 7 years ago. They thought he had a bowel obstruction but it resolved surgery did not intervene according to the family elevating the op report. He did have a large vomiting today pain came on afterwards. He is somewhat nauseous and one episode of vomiting. He denies any pain or swelling in his legs past history of PE. He has elevated factor VIII    No urinary symptoms    _____________________________  OLD REC:    PROCEDURE GENERAL INFORMATION    ACTUAL PROCEDURE: LAPAROTOMY EXPLORATORY                    ACTUAL PROCEDURE START: 11/18/2010 15:40                    ACTUAL PROCEDURE STOP: 11/18/2010 15:53                             Past Medical History:   Diagnosis Date    Ankle fracture     treated with cast    Bowel obstruction (HCC)     Due to ileus.  Chest pain 7/2012    admitted in hospital [place] r/o, pt refused stress test.    Elevated factor VIII level 11/20/2017    Elevated PSA 10/10/2017    Histoplasmosis     lungs. Seen  pulmonologist lung Bx    Lung nodule, solitary 11/14/2017    CT chest 9/2017: Right middle lobe 0.6 cm nodule. F/u CT chest in 12 months    Pulmonary embolism ([address] 9/30/2017    Unprovoked. No DVT both legs. Factor VIII elevation. Sent to him on Dr. Tonya Wilson for recommendations. He recommended prolonged anticoagulation as it is unprovoked pulmonary embolism     Past Surgical History:   Procedure Laterality Date    ABDOMEN SURGERY      exploratory for obstruction. No blockage. No resection    COLONOSCOPY  2013    pt thinks it was in 2013 by Dr Tono Khoury in [place].      Family History   Problem Relation Age of Onset    Colon Cancer Father 80     Social History     Socioeconomic History    Marital status:      Spouse name: Not on file    Number of children: 0    Years of education: 15    Highest education level: Not on file   Occupational History     Comment:    Tobacco Use    Smoking status: Never Smoker    Smokeless tobacco: Never Used   Substance and Sexual Activity    Alcohol use: No    Drug use: No    Sexual activity: Yes     Partners: Female   Other Topics Concern    Not on file   Social History Narrative    Not on file     Social Determinants of Health     Financial Resource Strain:     Difficulty of Paying Living Expenses: Not on file   Food Insecurity:     Worried About Running Out of Food in the Last Year: Not on file    Katie of Food in the Last Year: Not on file   Transportation Needs:     Lack of Transportation (Medical): Not on file    Lack of Transportation (Non-Medical): Not on file   Physical Activity:     Days of Exercise per Week: Not on file    Minutes of Exercise per Session: Not on file   Stress:     Feeling of Stress : Not on file   Social Connections:     Frequency of Communication with Friends and Family: Not on file    Frequency of Social Gatherings with Friends and Family: Not on file    Attends Faith Services: Not on file    Active Member of [address] [place] [address] or Organizations: Not on file    Attends Club or Organization Meetings: Not on file    Marital Status: Not on file   Intimate Partner Violence:     Fear of Current or Ex-Partner: Not on file    Emotionally Abused: Not on file    Physically Abused: Not on file    Sexually Abused: Not on file   Housing Stability:     Unable to Pay for Housing in the Last Year: Not on file    Number of [place] in the Last Year: Not on file    Unstable Housing in the Last Year: Not on file     No current facility-administered medications for this encounter. No current outpatient medications on file.      Facility-Administered Medications Ordered in Other Encounters   Medication Dose Route Frequency Provider Last Rate Last Admin    sodium chloride flush 0.9 % injection 10 mL  10 mL IntraVENous 2 times per day [facility] Jacoboford, DO   10 mL at 03/29/22 0002    sodium chloride flush 0.9 % injection 10 mL  10 mL IntraVENous PRN [facility] Gilford, DO        0.9 % sodium chloride infusion   IntraVENous PRN Hadassah Gilford, DO        ondansetron (ZOFRAN-ODT) disintegrating tablet 4 mg  4 mg Oral Q8H PRN Hadassah Gilford, DO        Or    ondansetron (ZOFRAN) injection 4 mg  4 mg IntraVENous Q6H PRN Hadassah Gilford, DO        bisacodyl (DULCOLAX) EC tablet 5 mg  5 mg Oral Daily PRN Hadassah Gilford, DO        acetaminophen (TYLENOL) tablet 650 mg  650 mg Oral Q6H PRN [facility] Jacoboford, DO        Or    acetaminophen (TYLENOL) suppository 650 mg  650 mg Rectal Q6H PRN Hadassah Gilford, DO         No Known Allergies      ROS:    Review of Systems   Respiratory: Negative. Cardiovascular: Negative. Gastrointestinal: Positive for abdominal distention, abdominal pain, nausea and vomiting. Genitourinary: Negative. All other systems reviewed and are negative. Nursing Notes Reviewed    Physical Exam:      ED Triage Vitals [03/28/22 1435]   Enc Vitals Group      BP (!) 156/6      Pulse 80      Resp 20      Temp 98.1 °F (36.7 °C)      Temp Source Oral      SpO2 95 %      Weight 218 lb (98.9 kg)      Height 5' 8\" (1.727 m)      Head Circumference       Peak Flow       Pain Score       Pain Loc       Pain Edu? Excl. in [address]? Physical Exam  Vitals and nursing note reviewed. Exam conducted with a chaperone present. Constitutional:       Appearance: He is well-developed. He is obese. He is not ill-appearing. HENT:      Head: Normocephalic and atraumatic. Right Ear: External ear normal.      Left Ear: External ear normal.   Eyes:      General: No scleral icterus. Right eye: No discharge. Left eye: No discharge. Conjunctiva/sclera: Conjunctivae normal.      Pupils: Pupils are equal, round, and reactive to light.    Neck:      Thyroid: No thyromegaly. Vascular: No JVD. Trachea: No tracheal deviation. Cardiovascular:      Rate and Rhythm: Normal rate and regular rhythm. Heart sounds: Normal heart sounds. No murmur heard. No friction rub. No gallop. Pulmonary:      Effort: Pulmonary effort is normal. No respiratory distress. Breath sounds: Normal breath sounds. No stridor. No wheezing or rales. Chest:      Chest wall: No tenderness. Abdominal:      General: Abdomen is protuberant. A surgical scar is present. Bowel sounds are decreased. There is no distension. Palpations: Abdomen is soft. There is no mass. Tenderness: There is no abdominal tenderness. There is no guarding or rebound. Hernia: No hernia is present. Musculoskeletal:         General: No tenderness or deformity. Normal range of motion. Cervical back: Normal range of motion and neck supple. Lymphadenopathy:      Cervical: No cervical adenopathy. Skin:     General: Skin is warm and dry. Coloration: Skin is not pale. Findings: No erythema or rash. Neurological:      Mental Status: He is alert and oriented to person, place, and time. Cranial Nerves: No cranial nerve deficit. Sensory: No sensory deficit. Deep Tendon Reflexes: Reflexes are normal and symmetric. Reflexes normal.   Psychiatric:         Speech: Speech normal.         Behavior: Behavior normal.         Thought Content:  Thought content normal.         Judgment: Judgment normal.         I have reviewed and interpreted all of the currently available lab results from this visit (ifapplicable):  Results for orders placed or performed during the hospital encounter of 03/28/22   COVID-19, Rapid    Specimen: Nasopharyngeal   Result Value Ref Range    Source THROAT     SARS-CoV-2, NAAT NOT DETECTED NOT DETECTED   CBC with Auto Differential   Result Value Ref Range    WBC 14.6 (H) 4.0 - 10.5 K/CU MM    RBC 5.51 4.6 - 6.2 M/CU MM    Hemoglobin 17.6 13.5 - 18.0 GM/DL    Hematocrit 50.9 42 - 52 %    MCV 92.4 78 - 100 FL    MCH 31.9 (H) 27 - 31 PG    MCHC 34.6 32.0 - 36.0 %    RDW 12.2 11.7 - 14.9 %    Platelets 565 403 - 176 K/CU MM    MPV 9.4 7.5 - 11.1 FL    Differential Type AUTOMATED DIFFERENTIAL     Segs Relative 88.5 (H) 36 - 66 %    Lymphocytes % 6.7 (L) 24 - 44 %    Monocytes % 4.2 (H) 0 - 4 %    Eosinophils % 0.1 0 - 3 %    Basophils % 0.2 0 - 1 %    Segs Absolute 12.9 K/CU MM    Lymphocytes Absolute 1.0 K/CU MM    Monocytes Absolute 0.6 K/CU MM    Eosinophils Absolute 0.0 K/CU MM    Basophils Absolute 0.0 K/CU MM    Immature Neutrophil % 0.3 0 - 0.43 %    Total Immature Neutrophil 0.04 K/CU MM   Comprehensive Metabolic Panel w/ Reflex to MG   Result Value Ref Range    Sodium 139 135 - 145 MMOL/L    Potassium 4.1 3.5 - 5.1 MMOL/L    Chloride 102 99 - 110 mMol/L    CO2 25 21 - 32 MMOL/L    BUN 17 6 - 23 MG/DL    CREATININE 0.8 (L) 0.9 - 1.3 MG/DL    Glucose 115 (H) 70 - 99 MG/DL    Calcium 9.9 8.3 - 10.6 MG/DL    Albumin 4.7 3.4 - 5.0 GM/DL    Total Protein 7.6 6.4 - 8.2 GM/DL    Total Bilirubin 1.1 (H) 0.0 - 1.0 MG/DL    ALT 14 10 - 40 U/L    AST 18 15 - 37 IU/L    Alkaline Phosphatase 66 40 - 129 IU/L    GFR Non-African American >60 >60 mL/min/1.73m2    GFR African American >60 >60 mL/min/1.73m2    Anion Gap 12 4 - 16   Lipase   Result Value Ref Range    Lipase 20 13 - 60 IU/L      Radiographs (if obtained):  [] The following radiograph wasinterpreted by myself in the absence of a radiologist:   [] Radiologist's Report Reviewed:  CT ABDOMEN PELVIS WO CONTRAST Additional Contrast? None   Final Result   1. Small-bowel obstruction with discrete transition point in the right   hemiabdomen. 2. Trace ascites with new peritoneal thickening and faint peritoneal   nodularity, indeterminate but raises the possibility of peritoneal   carcinomatosis of unknown primary. Findings can also be seen in atypical   peritonitis.    3. Borderline wall thickening with faint adjacent fat stranding. Recommend   correlation with urinalysis. 4. Cholelithiasis. 5. Short segment of luminal narrowing at the rectosigmoid junction measuring   3 cm without measurable mass lesion. Consider further evaluation with   flexible sigmoidoscopy if this has not been recently performed. EKG (if obtained): (All EKG's are interpreted by myself in the absence of a cardiologist)    Chart review shows recent radiographs:  No results found. MDM:      Patient presents ED with complaint abdominal pain he has had a previous bowel obstruction and had a laparotomy about 12 years ago reviewed the medical records. Abdomen soft distended decreased bowel sounds CT unfortunately reveals a transition point in his mid abdomen gastric structures not grossly dilated do not believe he would benefit from NG tube and there is scant evidence for his benefit at this juncture he does not want anything for pain although he appears to be uncomfortable who been n.p.o. placed on IV hydration awaiting for callback from on-call surgery    ED Course as of 03/29/22 0637   Mon Mar 28, 2022   1604 Case discussed with on-call surgery Dr. Ramonita Christiansen [PW]      ED Course User Index  [PW] Garret Bah DO         Clinical Impression:  1. SBO (small bowel obstruction) ([address]      Disposition referral (if applicable):  No follow-up provider specified. Disposition medications (if applicable):  Discharge Medication List as of 3/28/2022  6:44 PM              Nadege Reyna DO, FACEP      Comment: Please note this report has been produced using speech recognition software and maycontain errors related to that system including errors in grammar, punctuation, and spelling, as well as words and phrases that may be inappropriate. If there are any questions or concerns please feel free to contact thedictating provider for clarification.         Garret Bah DO  03/29/22 8641

## 2022-03-29 ENCOUNTER — APPOINTMENT (OUTPATIENT)
Dept: GENERAL RADIOLOGY | Age: 67
DRG: 388 | End: 2022-03-29
Attending: STUDENT IN AN ORGANIZED HEALTH CARE EDUCATION/TRAINING PROGRAM
Payer: COMMERCIAL

## 2022-03-29 LAB
ANION GAP SERPL CALCULATED.3IONS-SCNC: 15 MMOL/L (ref 4–16)
BASOPHILS ABSOLUTE: 0 K/CU MM
BASOPHILS RELATIVE PERCENT: 0.3 % (ref 0–1)
BUN BLDV-MCNC: 18 MG/DL (ref 6–23)
CALCIUM SERPL-MCNC: 8.9 MG/DL (ref 8.3–10.6)
CHLORIDE BLD-SCNC: 106 MMOL/L (ref 99–110)
CO2: 19 MMOL/L (ref 21–32)
CREAT SERPL-MCNC: 0.6 MG/DL (ref 0.9–1.3)
DIFFERENTIAL TYPE: ABNORMAL
EOSINOPHILS ABSOLUTE: 0.1 K/CU MM
EOSINOPHILS RELATIVE PERCENT: 0.9 % (ref 0–3)
GFR AFRICAN AMERICAN: >60 ML/MIN/1.73M2
GFR NON-AFRICAN AMERICAN: >60 ML/MIN/1.73M2
GLUCOSE BLD-MCNC: 106 MG/DL (ref 70–99)
HCT VFR BLD CALC: 44.8 % (ref 42–52)
HEMOGLOBIN: 15.5 GM/DL (ref 13.5–18)
IMMATURE NEUTROPHIL %: 0.2 % (ref 0–0.43)
LYMPHOCYTES ABSOLUTE: 2 K/CU MM
LYMPHOCYTES RELATIVE PERCENT: 15.7 % (ref 24–44)
MCH RBC QN AUTO: 32.3 PG (ref 27–31)
MCHC RBC AUTO-ENTMCNC: 34.6 % (ref 32–36)
MCV RBC AUTO: 93.3 FL (ref 78–100)
MONOCYTES ABSOLUTE: 0.9 K/CU MM
MONOCYTES RELATIVE PERCENT: 6.8 % (ref 0–4)
NUCLEATED RBC %: 0 %
PDW BLD-RTO: 12.1 % (ref 11.7–14.9)
PLATELET # BLD: 253 K/CU MM (ref 140–440)
PMV BLD AUTO: 9.5 FL (ref 7.5–11.1)
POTASSIUM SERPL-SCNC: 4.3 MMOL/L (ref 3.5–5.1)
RBC # BLD: 4.8 M/CU MM (ref 4.6–6.2)
SEGMENTED NEUTROPHILS ABSOLUTE COUNT: 9.7 K/CU MM
SEGMENTED NEUTROPHILS RELATIVE PERCENT: 76.1 % (ref 36–66)
SODIUM BLD-SCNC: 140 MMOL/L (ref 135–145)
TOTAL IMMATURE NEUTOROPHIL: 0.03 K/CU MM
TOTAL NUCLEATED RBC: 0 K/CU MM
WBC # BLD: 12.7 K/CU MM (ref 4–10.5)

## 2022-03-29 PROCEDURE — 74018 RADEX ABDOMEN 1 VIEW: CPT

## 2022-03-29 PROCEDURE — 1200000000 HC SEMI PRIVATE

## 2022-03-29 PROCEDURE — 99223 1ST HOSP IP/OBS HIGH 75: CPT | Performed by: INTERNAL MEDICINE

## 2022-03-29 PROCEDURE — 85025 COMPLETE CBC W/AUTO DIFF WBC: CPT

## 2022-03-29 PROCEDURE — 71045 X-RAY EXAM CHEST 1 VIEW: CPT

## 2022-03-29 PROCEDURE — 80048 BASIC METABOLIC PNL TOTAL CA: CPT

## 2022-03-29 PROCEDURE — 99254 IP/OBS CNSLTJ NEW/EST MOD 60: CPT | Performed by: SURGERY

## 2022-03-29 PROCEDURE — 2580000003 HC RX 258: Performed by: SURGERY

## 2022-03-29 PROCEDURE — 6360000002 HC RX W HCPCS: Performed by: INTERNAL MEDICINE

## 2022-03-29 PROCEDURE — 36415 COLL VENOUS BLD VENIPUNCTURE: CPT

## 2022-03-29 PROCEDURE — 2580000003 HC RX 258: Performed by: INTERNAL MEDICINE

## 2022-03-29 PROCEDURE — 94761 N-INVAS EAR/PLS OXIMETRY MLT: CPT

## 2022-03-29 RX ORDER — SODIUM CHLORIDE 9 MG/ML
INJECTION, SOLUTION INTRAVENOUS PRN
Status: DISCONTINUED | OUTPATIENT
Start: 2022-03-29 | End: 2022-03-29

## 2022-03-29 RX ORDER — SODIUM CHLORIDE 9 MG/ML
INJECTION, SOLUTION INTRAVENOUS CONTINUOUS
Status: DISCONTINUED | OUTPATIENT
Start: 2022-03-29 | End: 2022-03-30 | Stop reason: HOSPADM

## 2022-03-29 RX ADMIN — SODIUM CHLORIDE: 9 INJECTION, SOLUTION INTRAVENOUS at 09:55

## 2022-03-29 RX ADMIN — SODIUM CHLORIDE, PRESERVATIVE FREE 10 ML: 5 INJECTION INTRAVENOUS at 09:00

## 2022-03-29 RX ADMIN — SODIUM CHLORIDE, PRESERVATIVE FREE 10 ML: 5 INJECTION INTRAVENOUS at 00:02

## 2022-03-29 RX ADMIN — ENOXAPARIN SODIUM 100 MG: 100 INJECTION SUBCUTANEOUS at 20:18

## 2022-03-29 ASSESSMENT — ENCOUNTER SYMPTOMS
BACK PAIN: 0
VOMITING: 0
COUGH: 0
SORE THROAT: 0
STRIDOR: 0
DIARRHEA: 0
CONSTIPATION: 0
ABDOMINAL DISTENTION: 1
CHOKING: 0
SHORTNESS OF BREATH: 0
BLOOD IN STOOL: 0
COLOR CHANGE: 0
ABDOMINAL PAIN: 0
TROUBLE SWALLOWING: 0
ABDOMINAL PAIN: 1
NAUSEA: 0

## 2022-03-29 ASSESSMENT — PAIN SCALES - GENERAL
PAINLEVEL_OUTOF10: 0

## 2022-03-29 NOTE — PROGRESS NOTES
V2.0  [facility] Hospitalist Progress Note      Name:  Astrid Haider /Age/Sex: 1955  (78 y.o. male)   MRN & CSN:  [id number] & [id number] Encounter Date/Time: 3/29/2022 6:35 PM EDT    Location:  [address] [place] [address] PCP: Lucina Kong MD       Hospital Day: 2    Assessment and Plan:   Astrid Haider is a 78 y.o. male with pmh of Elevated Factor VII resulting in unprovoked PE who presents with Small bowel obstruction ([address]      Plan:  SBO  Patient having bowel movements and passing gas. Abdominal pain resolved. Bowel tones present. Will start clears tonight, full liquid in AM and regular meal at lunch tomorrow. Elevated factor VIII  Patient is on Rivaroxiban chronically. On Lovenox while in hospital.      Plan to DC home tomorrow if he tolerates regular diet for lunch. Diet ADULT DIET; Clear Liquid  ADULT DIET; Full Liquid  ADULT DIET; Regular   DVT Prophylaxis [x] Lovenox, []  Heparin, [] SCDs, [] Ambulation,  [] Eliquis, [] Xarelto   Code Status Full Code   Disposition Patient requires continued admission due to SBO   Surrogate Decision Maker/ DENVERMARCIANO Baldwin - spouse     Subjective:     Chief Complaint: No chief complaint on file. Astrid Haider is a 78 y.o. male who presents with abdominal pain. Found to have SBO vs Ileus. Appears to have resolved spontaneously with conservative measures. Review of Systems:    Review of Systems   Constitutional: Positive for appetite change. Negative for chills, fatigue and fever. Respiratory: Negative for shortness of breath. Cardiovascular: Negative for chest pain and palpitations. Gastrointestinal: Negative for abdominal pain, constipation, diarrhea, nausea and vomiting. Genitourinary: Negative for dysuria, flank pain, frequency, hematuria and urgency. Musculoskeletal: Negative for back pain. All other systems reviewed and are negative. Objective:        Intake/Output Summary (Last 24 hours) at 3/29/2022 1835  Last data filed at 3/29/2022 0002  Gross per 24 hour   Intake 10 ml   Output --   Net 10 ml        Vitals:   Vitals:    03/29/22 1422   BP: (!) 113/56   Pulse: 62   Resp:    Temp: 98.2 °F (36.8 °C)   SpO2: 95%       Physical Exam:     Physical Exam  Vitals and nursing note reviewed. Constitutional:       General: He is not in acute distress. Appearance: Normal appearance. He is normal weight. He is not ill-appearing or toxic-appearing. HENT:      Head: Normocephalic and atraumatic. Mouth/Throat:      Pharynx: Oropharynx is clear. Eyes:      General: No scleral icterus. Conjunctiva/sclera: Conjunctivae normal.   Cardiovascular:      Rate and Rhythm: Normal rate and regular rhythm. Heart sounds: Normal heart sounds. No murmur heard. Pulmonary:      Effort: Pulmonary effort is normal. No respiratory distress. Breath sounds: Normal breath sounds. Abdominal:      General: Abdomen is flat. Bowel sounds are normal. There is no distension. Palpations: Abdomen is soft. Tenderness: There is no abdominal tenderness. There is no guarding or rebound. Musculoskeletal:         General: No deformity or signs of injury. Normal range of motion. Cervical back: Normal range of motion and neck supple. Neurological:      General: No focal deficit present. Mental Status: He is alert and oriented to person, place, and time. Mental status is at baseline. Cranial Nerves: No cranial nerve deficit. Psychiatric:         Mood and Affect: Mood normal.         Behavior: Behavior normal.         Thought Content:  Thought content normal.         Judgment: Judgment normal.           Medications:   Medications:    enoxaparin  1 mg/kg SubCUTAneous BID    sodium chloride flush  10 mL IntraVENous 2 times per day      Infusions:    sodium chloride 100 mL/hr at 03/29/22 0955     PRN Meds: sodium chloride flush, 10 mL, PRN  ondansetron, 4 mg, Q8H PRN   Or  ondansetron, 4 mg, Q6H PRN  bisacodyl, 5 mg, Daily PRN  acetaminophen, 650 mg, Q6H PRN   Or  acetaminophen, 650 mg, Q6H PRN        Labs      Recent Results (from the past 24 hour(s))   Basic Metabolic Panel w/ Reflex to MG    Collection Time: 03/29/22  4:37 AM   Result Value Ref Range    Sodium 140 135 - 145 MMOL/L    Potassium 4.3 3.5 - 5.1 MMOL/L    Chloride 106 99 - 110 mMol/L    CO2 19 (L) 21 - 32 MMOL/L    Anion Gap 15 4 - 16    BUN 18 6 - 23 MG/DL    CREATININE 0.6 (L) 0.9 - 1.3 MG/DL    Glucose 106 (H) 70 - 99 MG/DL    Calcium 8.9 8.3 - 10.6 MG/DL    GFR Non-African American >60 >60 mL/min/1.73m2    GFR African American >60 >60 mL/min/1.73m2   CBC auto differential    Collection Time: 03/29/22  4:37 AM   Result Value Ref Range    WBC 12.7 (H) 4.0 - 10.5 K/CU MM    RBC 4.80 4.6 - 6.2 M/CU MM    Hemoglobin 15.5 13.5 - 18.0 GM/DL    Hematocrit 44.8 42 - 52 %    MCV 93.3 78 - 100 FL    MCH 32.3 (H) 27 - 31 PG    MCHC 34.6 32.0 - 36.0 %    RDW 12.1 11.7 - 14.9 %    Platelets 563 123 - 396 K/CU MM    MPV 9.5 7.5 - 11.1 FL    Differential Type AUTOMATED DIFFERENTIAL     Segs Relative 76.1 (H) 36 - 66 %    Lymphocytes % 15.7 (L) 24 - 44 %    Monocytes % 6.8 (H) 0 - 4 %    Eosinophils % 0.9 0 - 3 %    Basophils % 0.3 0 - 1 %    Segs Absolute 9.7 K/CU MM    Lymphocytes Absolute 2.0 K/CU MM    Monocytes Absolute 0.9 K/CU MM    Eosinophils Absolute 0.1 K/CU MM    Basophils Absolute 0.0 K/CU MM    Nucleated RBC % 0.0 %    Total Nucleated RBC 0.0 K/CU MM    Total Immature Neutrophil 0.03 K/CU MM    Immature Neutrophil % 0.2 0 - 0.43 %        Imaging/Diagnostics Last 24 Hours   CT ABDOMEN PELVIS WO CONTRAST Additional Contrast? None    Result Date: 3/28/2022  EXAMINATION: CT OF THE ABDOMEN AND PELVIS WITHOUT CONTRAST 3/28/2022 3:16 pm TECHNIQUE: CT of the abdomen and pelvis was performed without the administration of intravenous contrast. Multiplanar reformatted images are provided for review.  Dose modulation, iterative reconstruction, and/or weight based adjustment of the mA/kV was utilized to reduce the radiation dose to as low as reasonably achievable. COMPARISON: 09/30/2017 HISTORY: ORDERING SYSTEM PROVIDED HISTORY: ? SBO TECHNOLOGIST PROVIDED HISTORY: Reason for exam:->? SBO Additional Contrast?->None Decision Support Exception - unselect if not a suspected or confirmed emergency medical condition->Emergency Medical Condition (MA) Reason for Exam: ? SBO Additional signs and symptoms: ? SBO Relevant Medical/Surgical History: ? SBO FINDINGS: Lower Chest: Stable benign micronodule of the right middle lobe. Organs: Liver is normal in contour and attenuation. Cholelithiasis. No biliary ductal diltation. Pancreas is unremarkable. Adrenals are unremarkable. Spleen is normal in size. No renal calculi or hydronephrosis. 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 Vasculature is unremarkable. GI/Bowel: Short segment of luminal narrowing at the rectosigmoid junction measuring 3 cm without measurable mass lesion on series 2, image 132. Multiple mildly dilated loops of small bowel throughout the abdomen with multiple air-fluid levels and discrete transition point at the level of distal ileum in the right hemiabdomen on series 2, image 96. Appendix is normal. Pelvis: Borderline bladder wall thickening with adjacent fat stranding. Mild prostatomegaly. Peritoneum/Retroperitoneum:Trace ascites. New peritoneal thickening and faint peritoneal nodularity along the left pericolic gutter. No free air, organized fluid collection or lymphadenopathy. Bones: Multilevel degenerative disc disease. 1. Small-bowel obstruction with discrete transition point in the right hemiabdomen. 2. Trace ascites with new peritoneal thickening and faint peritoneal nodularity, indeterminate but raises the possibility of peritoneal carcinomatosis of unknown primary. Findings can also be seen in atypical peritonitis. 3. Borderline wall thickening with faint adjacent fat stranding.   Recommend correlation with urinalysis. 4. Cholelithiasis. 5. Short segment of luminal narrowing at the rectosigmoid junction measuring 3 cm without measurable mass lesion. Consider further evaluation with flexible sigmoidoscopy if this has not been recently performed. XR ABDOMEN (KUB) (SINGLE AP VIEW)    Result Date: 3/29/2022  EXAMINATION: ONE SUPINE XRAY VIEW(S) OF THE ABDOMEN 3/29/2022 12:02 am COMPARISON: None. HISTORY: ORDERING SYSTEM PROVIDED HISTORY: NGT placement TECHNOLOGIST PROVIDED HISTORY: Reason for exam:->NGT placement Reason for Exam: abdominal pain FINDINGS: The enteric tube is not visualized. Dilated loops of small bowel are noted. There is no gross free air. Cholelithiasis is present. The enteric tube is not visualized. Cholelithiasis. Dilated loops of small bowel are noted which could be related to small bowel obstruction. XR CHEST PORTABLE    Result Date: 3/29/2022  EXAMINATION: ONE XRAY VIEW OF THE CHEST 3/28/2022 11:52 pm COMPARISON: None. HISTORY: ORDERING SYSTEM PROVIDED HISTORY: RIJ CVC placed TECHNOLOGIST PROVIDED HISTORY: Reason for exam:->RIJ CVC placed Reason for Exam: RIJ CVC placed Additional signs and symptoms: none FINDINGS: The cardiomediastinal silhouette is within normal limits. There is no focal consolidation, pleural effusion, or pneumothorax. There is no evidence of edema. There is no evidence of a central line. No significant findings. Central line is unable to be visualized.        Electronically signed by Annemarie Guillaume MD on 3/29/2022 at 6:35 PM

## 2022-03-29 NOTE — PROGRESS NOTES
3/29. Pt admitted to unit, focused assessment completed, MD notified, orders in. Will cont to monitor, safety observed.

## 2022-03-29 NOTE — CONSULTS
Department of [facility] Lo Older   Consult Note    Date of Consult: 3/29/22      Reason for Consult: SBO    CHIEF COMPLAINT: Abdominal pain/distention    History Obtained From:  patient, electronic medical record    HISTORY OF PRESENT ILLNESS:      Patient presents with abdominal pain/distention x3 days. Had nausea and vomiting prior to admission. Pain is diffuse. Has improved since admission. Patient does have episodes of distention with eating certain foods. He has a history of exploratory laparotomy approximately 10 years ago for partial bowel obstruction. Exporter laparotomy was negative. Last colonoscopy 2013. Patient has family history with father having colon cancer diagnosed around age 79. Denies personal family history of IBD. Patient had bowel movements yesterday after presenting to ED. He had flatus yesterday and today. Patient denies blood in stool, melena, unexplained weight loss. Past Medical History:    Past Medical History:   Diagnosis Date    Ankle fracture     treated with cast    Bowel obstruction (HCC)     Due to ileus.  Chest pain 7/2012    admitted in hospital MI r/o, pt refused stress test.    Elevated factor VIII level 11/20/2017    Elevated PSA 10/10/2017    Histoplasmosis     lungs. Seen  pulmonologist lung Bx    Lung nodule, solitary 11/14/2017    CT chest 9/2017: Right middle lobe 0.6 cm nodule. F/u CT chest in 12 months    Pulmonary embolism ([address] 9/30/2017    Unprovoked. No DVT both legs. Factor VIII elevation. Sent to him on Dr. Eddie Raymundo for recommendations. He recommended prolonged anticoagulation as it is unprovoked pulmonary embolism       Past Surgical History:    Past Surgical History:   Procedure Laterality Date    ABDOMEN SURGERY      exploratory for obstruction. No blockage. No resection    COLONOSCOPY  2013    pt thinks it was in 2013 by Dr Lia Fraser in [place].        Current Medications:   Current Facility-Administered Medications   Medication Dose Route Frequency Provider Last Rate Last Admin    0.9 % sodium chloride infusion   IntraVENous Continuous Rimma Calderon  mL/hr at 03/29/22 0955 New Bag at 03/29/22 0955    sodium chloride flush 0.9 % injection 10 mL  10 mL IntraVENous 2 times per day [facility], DO   10 mL at 03/29/22 0900    sodium chloride flush 0.9 % injection 10 mL  10 mL IntraVENous PRN [facility], DO        ondansetron (ZOFRAN-ODT) disintegrating tablet 4 mg  4 mg Oral Q8H PRN [facility], DO        Or    ondansetron (ZOFRAN) injection 4 mg  4 mg IntraVENous Q6H PRN [facility], DO        bisacodyl (DULCOLAX) EC tablet 5 mg  5 mg Oral Daily PRN [facility], DO        acetaminophen (TYLENOL) tablet 650 mg  650 mg Oral Q6H PRN [facility], DO        Or    acetaminophen (TYLENOL) suppository 650 mg  650 mg Rectal Q6H PRN [facility], DO           Allergies:  Patient has no known allergies. Social History:   Social History     Socioeconomic History    Marital status:      Spouse name: Not on file    Number of children: 0    Years of education: 15    Highest education level: Not on file   Occupational History     Comment:    Tobacco Use    Smoking status: Never Smoker    Smokeless tobacco: Never Used   Substance and Sexual Activity    Alcohol use: No    Drug use: No    Sexual activity: Yes     Partners: Female   Other Topics Concern    Not on file   Social History Narrative    Not on file     Social Determinants of Health     Financial Resource Strain:     Difficulty of Paying Living Expenses: Not on file   Food Insecurity:     Worried About Running Out of Food in the Last Year: Not on file    Katie of Food in the Last Year: Not on file   Transportation Needs:     Lack of Transportation (Medical): Not on file    Lack of Transportation (Non-Medical):  Not on file   Physical Activity:     Days of Exercise per Week: Not on file    Minutes of Exercise per Session: Not on file   Stress:     Feeling of Stress : Not on file   Social Connections:     Frequency of Communication with Friends and Family: Not on file    Frequency of Social Gatherings with Friends and Family: Not on file    Attends Yazdanism Services: Not on file    Active Member of [address] [place] [address] or Organizations: Not on file    Attends Club or Organization Meetings: Not on file    Marital Status: Not on file   Intimate Partner Violence:     Fear of Current or Ex-Partner: Not on file    Emotionally Abused: Not on file    Physically Abused: Not on file    Sexually Abused: Not on file   Housing Stability:     Unable to Pay for Housing in the Last Year: Not on file    Number of [place] in the Last Year: Not on file    Unstable Housing in the Last Year: Not on file       Family History:   Family History   Problem Relation Age of Onset    Colon Cancer Father 80       REVIEW OFSYSTEMS:    Review of Systems   Constitutional: Negative for chills, fatigue, fever and unexpected weight change. HENT: Negative for sore throat and trouble swallowing. Respiratory: Negative for cough, choking, shortness of breath and stridor. Cardiovascular: Negative for chest pain, palpitations and leg swelling. Gastrointestinal: Positive for abdominal distention and abdominal pain. Negative for blood in stool, constipation, diarrhea, nausea and vomiting. Denies blood in stool, melena, change in caliber of stool. Denies unexplained weight loss. Musculoskeletal: Negative for back pain, gait problem and joint swelling. Skin: Negative for color change, rash and wound. Allergic/Immunologic: Negative for immunocompromised state. Neurological: Negative for dizziness, speech difficulty, weakness and light-headedness. Hematological: Negative for adenopathy. Does not bruise/bleed easily. Psychiatric/Behavioral: Negative for agitation and confusion. The patient is not nervous/anxious. PHYSICAL EXAM:  Vitals:    03/28/22 2030 03/28/22 2254 03/29/22 0426 03/29/22 0800   BP: (!) 148/74  130/77 136/68   Pulse: 76 76 77 71   Resp: 18   16   Temp: 97.5 °F (36.4 °C)  97.6 °F (36.4 °C) 98.2 °F (36.8 °C)   TempSrc: Oral  Oral Oral   SpO2: 93%  94% 95%   Weight:   215 lb (97.5 kg)        Physical Exam  General: No acute respiratory distress  Neck: No JVD, supple  Lungs: Clear to auscultation, chest rise equal bilaterally  Cardiac: RRR  Abdomen: Soft, nontender, mildly distended, no rebound or guarding  Extremities, no edema, cyanosis, or clubbing  Skin: No rashes or breakdown  Neurologic: cranial nerves II - XII grossly intact    DATA:    CBC:   Lab Results   Component Value Date    WBC 12.7 03/29/2022    RBC 4.80 03/29/2022    HGB 15.5 03/29/2022    HCT 44.8 03/29/2022    MCV 93.3 03/29/2022    MCH 32.3 03/29/2022    MCHC 34.6 03/29/2022    RDW 12.1 03/29/2022     03/29/2022    MPV 9.5 03/29/2022       IMPRESSION:        Patient Active Problem List:     Pulmonary embolism (HCC)     Elevated PSA     Lung nodule, solitary     Elevated factor VIII level     Neck mass     Small bowel obstruction (HCC)     SBO (small bowel obstruction) (HCC)      Partial small bowel obstruction    PLAN:    Partial small bowel suction. Had bowel movement yesterday. Passing flatus today. Continue n.p.o. today. If continues to have bowel movements or flatus will start clears tomorrow. Discussed possible need for surgery if symptoms worsen or fail to improve. If resolves with conservative management on discharge if having ongoing obstructive symptoms will get small bowel follow-through. Additionally on CT there is 3 cm area of narrowing at rectosigmoid junction. No obvious mass. Last colonoscopy 2013 significant for polyps. Denies changes to bowel habits. We will also plan for colonoscopy as outpatient.         [place],

## 2022-03-29 NOTE — CONSULTS
Hematology/Oncology  Consult Note      Reason for Consult: History of unprovoked PE, on long-term anticoagulation as per Dr. Ana Vogt  Requesting Physician: Hospitalist    CHIEF COMPLAINT: Abdominal pain with bloating    History Obtained From:  patient, spouse    HISTORY OF PRESENT ILLNESS:      The patient is a 78 y.o. male with significant past medical history of unprovoked PE in September 2017 who presents with above. 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 He was recommended by Dr. Ana Vogt to have long-term anticoagulation. Last time he was seen by Dr. Ana Vogt was in July 2020. He was recommended to continue to follow-up with family doctor and to continue with oral anticoagulation. He denies any previous history of blood clot. Denied any family history of blood clot. No underlying malignancy. Reportedly he had colonoscopy in [place]. CT abdomen pelvis on March 28, 2022 showed  1. Small-bowel obstruction with discrete transition point in the right  hemiabdomen. 2. Trace ascites with new peritoneal thickening and faint peritoneal  nodularity, indeterminate but raises the possibility of peritoneal  carcinomatosis of unknown primary.  Findings can also be seen in atypical  peritonitis. 3. Borderline wall thickening with faint adjacent fat stranding.  Recommend  correlation with urinalysis. 4. Cholelithiasis. 5. Short segment of luminal narrowing at the rectosigmoid junction measuring  3 cm without measurable mass lesion.  Consider further evaluation with  flexible sigmoidoscopy if this has not been recently performed. He felt better this morning. Surgeon has been consulted. If there is no plan for the surgical procedure he may resume the anticoagulation. Past Medical History:        Diagnosis Date    Ankle fracture     treated with cast    Bowel obstruction (HCC)     Due to ileus.     Chest pain 7/2012    admitted in hospital MI r/o, pt refused stress test.    Elevated factor VIII level 11/20/2017    Elevated PSA 10/10/2017    Histoplasmosis     lungs. Seen  pulmonologist lung Bx    Lung nodule, solitary 11/14/2017    CT chest 9/2017: Right middle lobe 0.6 cm nodule. F/u CT chest in 12 months    Pulmonary embolism ([address] 9/30/2017    Unprovoked. No DVT both legs. Factor VIII elevation. Sent to him on Dr. Katya Lombardi for recommendations. He recommended prolonged anticoagulation as it is unprovoked pulmonary embolism     Past Surgical History:        Procedure Laterality Date    ABDOMEN SURGERY      exploratory for obstruction. No blockage. No resection    COLONOSCOPY  2013    pt thinks it was in 2013 by Dr Aamir Mccain in [place]. Current Medications:    Current Facility-Administered Medications: 0.9 % sodium chloride infusion, , IntraVENous, Continuous  sodium chloride flush 0.9 % injection 10 mL, 10 mL, IntraVENous, 2 times per day  sodium chloride flush 0.9 % injection 10 mL, 10 mL, IntraVENous, PRN  ondansetron (ZOFRAN-ODT) disintegrating tablet 4 mg, 4 mg, Oral, Q8H PRN **OR** ondansetron (ZOFRAN) injection 4 mg, 4 mg, IntraVENous, Q6H PRN  bisacodyl (DULCOLAX) EC tablet 5 mg, 5 mg, Oral, Daily PRN  acetaminophen (TYLENOL) tablet 650 mg, 650 mg, Oral, Q6H PRN **OR** acetaminophen (TYLENOL) suppository 650 mg, 650 mg, Rectal, Q6H PRN    Allergies:  Patient has no known allergies. Social History:    TOBACCO:   reports that he has never smoked. He has never used smokeless tobacco.  ETOH:   reports no history of alcohol use. DRUGS:   reports no history of drug use. Family History:       Problem Relation Age of Onset    Colon Cancer Father 80     REVIEW OF SYSTEMS:    He felt better. He was able to pass gas. Less abdominal pain. No nausea or vomiting. Denied any fever or chills. No chest pain or shortness of breath. No headaches or dizzy spell. The remainder of the review of system is unremarkable.     PHYSICAL EXAM:      Vitals:    /68   Pulse 71   Temp 98.2 °F (36.8 °C) (Oral)   Resp 16   Wt 215 lb (97.5 kg)   SpO2 95%   BMI 32.69 kg/m²     CONSTITUTIONAL:  awake, alert, cooperative and no apparent distress  EYES:  extra-ocular muscles intact and sclera clear  NECK:  supple, symmetrical, trachea midline and no lymphadenopathy  LUNGS:  clear to auscultation, no crackles or wheezing  CARDIOVASCULAR:  normal S1 and S2 and no murmur noted  ABDOMEN:  hypoactive bowel sounds, soft, non-distended and no rebound or guarding. MUSCULOSKELETAL:  there is no redness, warmth, or swelling of the joints  full range of motion noted  NEUROLOGIC:  cranial nerves II-XII are grossly intact  SKIN:  no rashes and no jaundice    DATA:    Labs:  General Labs:    CBC with Differential:    Lab Results   Component Value Date    WBC 12.7 03/29/2022    RBC 4.80 03/29/2022    HGB 15.5 03/29/2022    HCT 44.8 03/29/2022     03/29/2022    MCV 93.3 03/29/2022    MCH 32.3 03/29/2022    MCHC 34.6 03/29/2022    RDW 12.1 03/29/2022    SEGSPCT 76.1 03/29/2022    BANDSPCT 14 11/18/2010    LYMPHOPCT 15.7 03/29/2022    MONOPCT 6.8 03/29/2022    MYELOPCT 1 11/18/2010    BASOPCT 0.3 03/29/2022    MONOSABS 0.9 03/29/2022    LYMPHSABS 2.0 03/29/2022    EOSABS 0.1 03/29/2022    BASOSABS 0.0 03/29/2022    DIFFTYPE AUTOMATED DIFFERENTIAL 03/29/2022     CMP:    Lab Results   Component Value Date     03/29/2022    K 4.3 03/29/2022     03/29/2022    CO2 19 03/29/2022    BUN 18 03/29/2022    CREATININE 0.6 03/29/2022    GFRAA >60 03/29/2022    LABGLOM >60 03/29/2022    GLUCOSE 106 03/29/2022    PROT 7.6 03/28/2022    PROT 7.6 07/14/2012    LABALBU 4.7 03/28/2022    CALCIUM 8.9 03/29/2022    BILITOT 1.1 03/28/2022    ALKPHOS 66 03/28/2022    AST 18 03/28/2022    ALT 14 03/28/2022       IMPRESSION/RECOMMENDATIONS:    1. He has history of unprovoked PE in 2017. At that time he was seen by Dr. Jerod Wallace. He was found to have elevated factor VIII level.   He was recommended to continue with long-term anticoagulation. Last time he saw Dr. Katya Lombardi was in July 2020. He has been followed by family doctor at present time. 2.  He was seen by surgeon for the partial small bowel obstruction. I recommend to start him on Lovenox injection temporarily. Surgeon plans to have colonoscopy as an outpatient. If no surgical procedure is planned, he may resume oral anticoagulation. We will continue to follow him closely.   Thanks

## 2022-03-29 NOTE — PLAN OF CARE
-- Message is from the [facility]--    Referral Request  Name of Specialist: Angeli Millan   Provider's specialty: ENT    Medical condition for referral:  Patient is scheduled to have a procedure today at 10:30AM for a Adenotonsillectomy     Is this a NEW request?: yes      Referral ordered by: Robbie Matthew      Insurance type: hmo      Payor: [employer] / Plan: [employer] / Product Type: [employer] Risk      Preferred Delivery Method   Fax - number to send to: [phone]    Caller Information       Type Contact Phone    07/29/2019 08:18 AM Phone (Incoming) Drea (Provider) [phone]     [facility]          Alternative phone number: none    Turnaround time given to caller:   \"This message will be sent to [state Provider's full name]. The clinical team will return your call as soon as they review your message. Typically, it takes 3 business days to process referral requests.\"   Problem: Infection:  Goal: Will remain free from infection  Description: Will remain free from infection  Outcome: Ongoing     Problem: Safety:  Goal: Free from accidental physical injury  Description: Free from accidental physical injury  Outcome: Ongoing  Goal: Free from intentional harm  Description: Free from intentional harm  Outcome: Ongoing     Problem: Daily Care:  Goal: Daily care needs are met  Description: Daily care needs are met  Outcome: Ongoing  Goal: Able to ambulate independently  Description: Able to ambulate independently  Outcome: Ongoing     Problem: Pain:  Goal: Patient's pain/discomfort is manageable  Description: Patient's pain/discomfort is manageable  Outcome: Ongoing     Problem: Skin Integrity:  Goal: Skin integrity will stabilize  Description: Skin integrity will stabilize  Outcome: Ongoing     Problem: Discharge Planning:  Goal: Patients continuum of care needs are met  Description: Patients continuum of care needs are met  Outcome: Ongoing

## 2022-03-29 NOTE — H&P
History and Physical      Name:  Chrissie Trejo /Age/Sex: 1955  (78 y.o. male)   MRN & CSN:  [id number] & [id number] Encounter Date/Time: 3/28/2022 9:11 PM EDT   Location:  [address] [place] [address] PCP: Carol Christianson MD       Hospital Day: 1    Assessment and Plan:   Chrissie Trejo is a 78 y.o. male who presents with     SBO  - NPO  - NG tube place to LIS, if vomiting  - ED called surgeon, will f/u  - IVF  - zofran    H/o PE 4 years ago  - on xarelto, held, if needing sx, d/w hema and sx if needing to start heparin drip tomorrow  - states has a blood coagulation dz, does not know name  - follows Dr. Apryl Hilliard     D/w ED provider  Code status Full  DVT PPx SCD      History of Present Illness:     Chrissie Trejo is a 78 y.o. male with nausea and vomiting that started this morning, states that he had 2 episodes within 10 minutes, nonbloody nonbilious. Patient also states that he has been having low moods, however has had generalized abdominal pain with distention. Patient was diagnosed with SBO, and transfer to [facility], states that he had SBO about 10 years ago, had exploratory surgery and was just found to have ileus. Patient otherwise has no other complaints. Review of Systems: Need 10 Elements       Pt otherwise has no complaints of CP, SOB, dizziness, dysuria, joint pains, rash/boils, cough or fevers. Objective:   No intake or output data in the 24 hours ending 22   Vitals:   Vitals:    22   BP: (!) 148/74   Pulse: 76   Resp: 18   Temp: 97.5 °F (36.4 °C)   TempSrc: Oral   SpO2: 93%       Medications Prior to Admission     Prior to Admission medications    Medication Sig Start Date End Date Taking? Authorizing Provider   XARELTO 10 MG TABS tablet TAKE 1 TABLET BY MOUTH EVERY DAY 21   Carol Christianson MD       Physical Exam: Need 8 Elements   General: NAD   Eyes: EOMI  ENT: neck supple  Cardiovascular: Regular rate.  NO + edema  Respiratory: Symmetrical expansion, Gastrointestinal: Soft, non tender  Genitourinary: no suprapubic tenderness  Skin: warm, dry  Neuro: Alert. Oriented  Psych: Mood appropriate. Past Medical History:   PMHx   Past Medical History:   Diagnosis Date    Ankle fracture     treated with cast    Bowel obstruction (HCC)     Due to ileus.  Chest pain 7/2012    admitted in hospital MI r/o, pt refused stress test.    Elevated factor VIII level 11/20/2017    Elevated PSA 10/10/2017    Histoplasmosis     lungs. Seen  pulmonologist lung Bx    Lung nodule, solitary 11/14/2017    CT chest 9/2017: Right middle lobe 0.6 cm nodule. F/u CT chest in 12 months    Pulmonary embolism ([address] 9/30/2017    Unprovoked. No DVT both legs. Factor VIII elevation. Sent to him on Dr. Eddie Raymundo for recommendations. He recommended prolonged anticoagulation as it is unprovoked pulmonary embolism     PSHX:  has a past surgical history that includes Abdomen surgery and Colonoscopy (2013). Allergies: No Known Allergies  Fam HX:  family history includes Colon Cancer (age of onset: 80) in his father. Soc HX:   Social History     Socioeconomic History    Marital status:      Spouse name: Not on file    Number of children: 0    Years of education: 15    Highest education level: Not on file   Occupational History     Comment:    Tobacco Use    Smoking status: Never Smoker    Smokeless tobacco: Never Used   Substance and Sexual Activity    Alcohol use: No    Drug use: No    Sexual activity: Yes     Partners: Female   Other Topics Concern    Not on file   Social History Narrative    Not on file     Social Determinants of Health     Financial Resource Strain:     Difficulty of Paying Living Expenses: Not on file   Food Insecurity:     Worried About Running Out of Food in the Last Year: Not on file    Katie of Food in the Last Year: Not on file   Transportation Needs:     Lack of Transportation (Medical):  Not on file    Lack of 09/30/2017    CLARITYU CLEAR 09/30/2017    SPECGRAV 1.025 09/30/2017    LEUKOCYTESUR SMALL 09/30/2017    UROBILINOGEN 0.2 09/30/2017    BILIRUBINUR NEGATIVE 09/30/2017    BLOODU NEGATIVE 09/30/2017    KETUA NEGATIVE 09/30/2017     Urine Cultures: No results found for: Cale Stark  Blood Cultures: No results found for: BC  No results found for: BLOODCULT2  Organism: No results found for: ORG    Imaging/Diagnostics Last 24 Hours   CT ABDOMEN PELVIS WO CONTRAST Additional Contrast? None    Result Date: 3/28/2022  EXAMINATION: CT OF THE ABDOMEN AND PELVIS WITHOUT CONTRAST 3/28/2022 3:16 pm TECHNIQUE: CT of the abdomen and pelvis was performed without the administration of intravenous contrast. Multiplanar reformatted images are provided for review. Dose modulation, iterative reconstruction, and/or weight based adjustment of the mA/kV was utilized to reduce the radiation dose to as low as reasonably achievable. COMPARISON: 09/30/2017 HISTORY: ORDERING SYSTEM PROVIDED HISTORY: ? SBO TECHNOLOGIST PROVIDED HISTORY: Reason for exam:->? SBO Additional Contrast?->None Decision Support Exception - unselect if not a suspected or confirmed emergency medical condition->Emergency Medical Condition (MA) Reason for Exam: ? SBO Additional signs and symptoms: ? SBO Relevant Medical/Surgical History: ? SBO FINDINGS: Lower Chest: Stable benign micronodule of the right middle lobe. Organs: Liver is normal in contour and attenuation. Cholelithiasis. No biliary ductal diltation. Pancreas is unremarkable. Adrenals are unremarkable. Spleen is normal in size. No renal calculi or hydronephrosis. 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 Vasculature is unremarkable. GI/Bowel: Short segment of luminal narrowing at the rectosigmoid junction measuring 3 cm without measurable mass lesion on series 2, image 132. Multiple mildly dilated loops of small bowel throughout the abdomen with multiple air-fluid levels and discrete transition point at the level of distal ileum in the right hemiabdomen on series 2, image 96. Appendix is normal. Pelvis: Borderline bladder wall thickening with adjacent fat stranding. Mild prostatomegaly. Peritoneum/Retroperitoneum:Trace ascites. New peritoneal thickening and faint peritoneal nodularity along the left pericolic gutter. No free air, organized fluid collection or lymphadenopathy. Bones: Multilevel degenerative disc disease. 1. Small-bowel obstruction with discrete transition point in the right hemiabdomen. 2. Trace ascites with new peritoneal thickening and faint peritoneal nodularity, indeterminate but raises the possibility of peritoneal carcinomatosis of unknown primary. Findings can also be seen in atypical peritonitis. 3. Borderline wall thickening with faint adjacent fat stranding. Recommend correlation with urinalysis. 4. Cholelithiasis. 5. Short segment of luminal narrowing at the rectosigmoid junction measuring 3 cm without measurable mass lesion. Consider further evaluation with flexible sigmoidoscopy if this has not been recently performed.            Electronically signed by Napoleon Mares MD on 3/28/2022 at 9:11 PM

## 2022-03-30 VITALS
TEMPERATURE: 97.9 F | SYSTOLIC BLOOD PRESSURE: 134 MMHG | BODY MASS INDEX: 32.72 KG/M2 | DIASTOLIC BLOOD PRESSURE: 71 MMHG | OXYGEN SATURATION: 95 % | RESPIRATION RATE: 16 BRPM | HEART RATE: 72 BPM | HEIGHT: 68 IN | WEIGHT: 215.9 LBS

## 2022-03-30 LAB
ANION GAP SERPL CALCULATED.3IONS-SCNC: 12 MMOL/L (ref 4–16)
BASOPHILS ABSOLUTE: 0 K/CU MM
BASOPHILS RELATIVE PERCENT: 0.3 % (ref 0–1)
BUN BLDV-MCNC: 15 MG/DL (ref 6–23)
CALCIUM SERPL-MCNC: 8.7 MG/DL (ref 8.3–10.6)
CHLORIDE BLD-SCNC: 104 MMOL/L (ref 99–110)
CO2: 24 MMOL/L (ref 21–32)
CREAT SERPL-MCNC: 0.7 MG/DL (ref 0.9–1.3)
DIFFERENTIAL TYPE: ABNORMAL
EOSINOPHILS ABSOLUTE: 0.2 K/CU MM
EOSINOPHILS RELATIVE PERCENT: 2.5 % (ref 0–3)
GFR AFRICAN AMERICAN: >60 ML/MIN/1.73M2
GFR NON-AFRICAN AMERICAN: >60 ML/MIN/1.73M2
GLUCOSE BLD-MCNC: 99 MG/DL (ref 70–99)
HCT VFR BLD CALC: 43.7 % (ref 42–52)
HEMOGLOBIN: 14.8 GM/DL (ref 13.5–18)
IMMATURE NEUTROPHIL %: 0.2 % (ref 0–0.43)
LYMPHOCYTES ABSOLUTE: 1.8 K/CU MM
LYMPHOCYTES RELATIVE PERCENT: 20.3 % (ref 24–44)
MCH RBC QN AUTO: 31.8 PG (ref 27–31)
MCHC RBC AUTO-ENTMCNC: 33.9 % (ref 32–36)
MCV RBC AUTO: 93.8 FL (ref 78–100)
MONOCYTES ABSOLUTE: 0.6 K/CU MM
MONOCYTES RELATIVE PERCENT: 6.6 % (ref 0–4)
NUCLEATED RBC %: 0 %
PDW BLD-RTO: 12.2 % (ref 11.7–14.9)
PLATELET # BLD: 214 K/CU MM (ref 140–440)
PMV BLD AUTO: 9.8 FL (ref 7.5–11.1)
POTASSIUM SERPL-SCNC: 4 MMOL/L (ref 3.5–5.1)
RBC # BLD: 4.66 M/CU MM (ref 4.6–6.2)
SEGMENTED NEUTROPHILS ABSOLUTE COUNT: 6.1 K/CU MM
SEGMENTED NEUTROPHILS RELATIVE PERCENT: 70.1 % (ref 36–66)
SODIUM BLD-SCNC: 140 MMOL/L (ref 135–145)
TOTAL IMMATURE NEUTOROPHIL: 0.02 K/CU MM
TOTAL NUCLEATED RBC: 0 K/CU MM
WBC # BLD: 8.7 K/CU MM (ref 4–10.5)

## 2022-03-30 PROCEDURE — 85025 COMPLETE CBC W/AUTO DIFF WBC: CPT

## 2022-03-30 PROCEDURE — 2580000003 HC RX 258: Performed by: INTERNAL MEDICINE

## 2022-03-30 PROCEDURE — 99232 SBSQ HOSP IP/OBS MODERATE 35: CPT | Performed by: INTERNAL MEDICINE

## 2022-03-30 PROCEDURE — 6360000002 HC RX W HCPCS: Performed by: INTERNAL MEDICINE

## 2022-03-30 PROCEDURE — 80048 BASIC METABOLIC PNL TOTAL CA: CPT

## 2022-03-30 PROCEDURE — 36415 COLL VENOUS BLD VENIPUNCTURE: CPT

## 2022-03-30 RX ADMIN — ENOXAPARIN SODIUM 100 MG: 100 INJECTION SUBCUTANEOUS at 09:13

## 2022-03-30 RX ADMIN — SODIUM CHLORIDE: 9 INJECTION, SOLUTION INTRAVENOUS at 06:15

## 2022-03-30 ASSESSMENT — PAIN SCALES - GENERAL: PAINLEVEL_OUTOF10: 0

## 2022-03-30 NOTE — PROGRESS NOTES
Hematology/Oncology  Progress Note        HISTORY OF PRESENT ILLNESS:      The patient is a 78 y.o. male with significant past medical history of unprovoked PE in September 2017 who presents with above. 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 He was recommended by Dr. James Padilla to have long-term anticoagulation. Last time he was seen by Dr. James Padilla was in July 2020. He was recommended to continue to follow-up with family doctor and to continue with oral anticoagulation. He denies any previous history of blood clot. Denied any family history of blood clot. No underlying malignancy. Reportedly he had colonoscopy in [place]. CT abdomen pelvis on March 28, 2022 showed  1. Small-bowel obstruction with discrete transition point in the right  hemiabdomen. 2. Trace ascites with new peritoneal thickening and faint peritoneal  nodularity, indeterminate but raises the possibility of peritoneal  carcinomatosis of unknown primary.  Findings can also be seen in atypical peritonitis. 3. Borderline wall thickening with faint adjacent fat stranding.  Recommend correlation with urinalysis. 4. Cholelithiasis. 5. Short segment of luminal narrowing at the rectosigmoid junction measuring 3 cm without measurable mass lesion.  Consider further evaluation with flexible sigmoidoscopy if this has not been recently performed. He is ambulating. He had bowel movement yesterday. This morning he passed gas. He is aware about CT abdomen result. I recommend he follow up with GI, surgeon and PCP on discharge. I recommend he also follow up at cancer ctr.       PHYSICAL EXAM:      Vitals:    /61   Pulse 65   Temp 97.6 °F (36.4 °C) (Oral)   Resp 16   Ht 5' 8\" (1.727 m)   Wt 215 lb 14.4 oz (97.9 kg)   SpO2 96%   BMI 32.83 kg/m²     CONSTITUTIONAL:  awake, alert, cooperative and no apparent distress  EYES:  extra-ocular muscles intact and sclera clear  NECK:  supple, symmetrical, trachea midline and no lymphadenopathy  LUNGS:  clear to auscultation, no crackles or wheezing  CARDIOVASCULAR:  normal S1 and S2 and no murmur noted  ABDOMEN:  + bowel sounds, soft, non-distended and no rebound or guarding. MUSCULOSKELETAL:  there is no redness, warmth, or swelling of the joints. full range of motion noted  NEUROLOGIC:  Motor is grossly intact, Cranial nerves II-XII are grossly intact  SKIN:  no rashes and no jaundice    DATA:    Labs:  General Labs:    CBC with Differential:    Lab Results   Component Value Date    WBC 12.7 03/29/2022    RBC 4.80 03/29/2022    HGB 15.5 03/29/2022    HCT 44.8 03/29/2022     03/29/2022    MCV 93.3 03/29/2022    MCH 32.3 03/29/2022    MCHC 34.6 03/29/2022    RDW 12.1 03/29/2022    SEGSPCT 76.1 03/29/2022    BANDSPCT 14 11/18/2010    LYMPHOPCT 15.7 03/29/2022    MONOPCT 6.8 03/29/2022    MYELOPCT 1 11/18/2010    BASOPCT 0.3 03/29/2022    MONOSABS 0.9 03/29/2022    LYMPHSABS 2.0 03/29/2022    EOSABS 0.1 03/29/2022    BASOSABS 0.0 03/29/2022    DIFFTYPE AUTOMATED DIFFERENTIAL 03/29/2022     CMP:    Lab Results   Component Value Date     03/29/2022    K 4.3 03/29/2022     03/29/2022    CO2 19 03/29/2022    BUN 18 03/29/2022    CREATININE 0.6 03/29/2022    GFRAA >60 03/29/2022    LABGLOM >60 03/29/2022    GLUCOSE 106 03/29/2022    PROT 7.6 03/28/2022    PROT 7.6 07/14/2012    LABALBU 4.7 03/28/2022    CALCIUM 8.9 03/29/2022    BILITOT 1.1 03/28/2022    ALKPHOS 66 03/28/2022    AST 18 03/28/2022    ALT 14 03/28/2022       IMPRESSION/RECOMMENDATIONS:    1. He has history of unprovoked PE in 2017. At that time he was seen by Dr. Niels Servin. He was found to have elevated factor VIII level. He was recommended to continue with long-term anticoagulation. Last time he saw Dr. Niels Servin was in July 2020. He has been followed by family doctor at present time. 2.  He was seen by surgeon for the partial small bowel obstruction.   I recommend to start him on Lovenox injection temporarily. Surgeon plans to have colonoscopy as an outpatient. If no surgical procedure is planned, he may resume oral anticoagulation. 3. He is aware about CT abdomen report. I discussed with Dr Meagan Mauricio and agreed with plan of colonoscopy, SBFT and potential laparascopic procedure. Thanks. He may be discharged on DOAC. Please schedule follow up at cancer ctr. We will continue to follow him closely.   Thanks

## 2022-03-30 NOTE — PLAN OF CARE
Problem: Infection:  Goal: Will remain free from infection  Description: Will remain free from infection  3/29/2022 2338 by Marianela Bartlett RN  Outcome: Ongoing  3/29/2022 1419 by Lili Lange RN  Outcome: Ongoing     Problem: Safety:  Goal: Free from accidental physical injury  Description: Free from accidental physical injury  3/29/2022 2338 by Marianela Bartlett RN  Outcome: Ongoing  3/29/2022 1419 by Lili Lange RN  Outcome: Ongoing  Goal: Free from intentional harm  Description: Free from intentional harm  3/29/2022 2338 by Marianela Bartlett RN  Outcome: Ongoing  3/29/2022 1419 by Lili Lange RN  Outcome: Ongoing     Problem: Daily Care:  Goal: Daily care needs are met  Description: Daily care needs are met  3/29/2022 2338 by Marianela Bartlett RN  Outcome: Ongoing  3/29/2022 1419 by Lili Lange RN  Outcome: Ongoing  Goal: Able to ambulate independently  Description: Able to ambulate independently  3/29/2022 2338 by Marianela Bartlett RN  Outcome: Ongoing  3/29/2022 1419 by Lili Lange RN  Outcome: Ongoing

## 2022-03-30 NOTE — PLAN OF CARE
Problem: Infection:  Goal: Will remain free from infection  Description: Will remain free from infection  3/30/2022 1335 by Ruel Roper RN  Outcome: Ongoing  3/29/2022 2338 by Arthur Ashley RN  Outcome: Ongoing     Problem: Safety:  Goal: Free from accidental physical injury  Description: Free from accidental physical injury  3/30/2022 1335 by Ruel Roper RN  Outcome: Ongoing  3/29/2022 2338 by Arthur Ashley RN  Outcome: Ongoing  Goal: Free from intentional harm  Description: Free from intentional harm  3/30/2022 1335 by Ruel Roper RN  Outcome: Ongoing  3/29/2022 2338 by Arthur Ashley RN  Outcome: Ongoing     Problem: Daily Care:  Goal: Daily care needs are met  Description: Daily care needs are met  3/30/2022 1335 by Ruel Roper RN  Outcome: Ongoing  3/29/2022 2338 by Arthur Ashley RN  Outcome: Ongoing  Goal: Able to ambulate independently  Description: Able to ambulate independently  3/30/2022 1335 by Ruel Roper RN  Outcome: Ongoing  3/29/2022 2338 by Arthur Ashley RN  Outcome: Ongoing     Problem: Pain:  Goal: Patient's pain/discomfort is manageable  Description: Patient's pain/discomfort is manageable  3/30/2022 1335 by Ruel Roper RN  Outcome: Ongoing  3/29/2022 2338 by Arthur Ashley RN  Outcome: Ongoing     Problem: Skin Integrity:  Goal: Skin integrity will stabilize  Description: Skin integrity will stabilize  3/30/2022 1335 by Ruel Roper RN  Outcome: Ongoing  3/29/2022 2338 by Arthur Ashley RN  Outcome: Ongoing     Problem: Discharge Planning:  Goal: Patients continuum of care needs are met  Description: Patients continuum of care needs are met  3/30/2022 1335 by Ruel Roper RN  Outcome: Ongoing  3/29/2022 2338 by Arthur Ashley RN  Outcome: Ongoing     Problem: Falls - Risk of:  Goal: Will remain free from falls  Description: Will remain free from falls  3/30/2022 1335 by Ruel Roper RN  Outcome: Ongoing  3/29/2022 2338 by Arthur Ashley RN  Outcome: Ongoing  Goal: Absence of physical injury  Description: Absence of physical injury  3/30/2022 1335 by Rukhsana Vera RN  Outcome: Ongoing  3/29/2022 2338 by Luiza Phillips RN  Outcome: Ongoing

## 2022-03-30 NOTE — CARE COORDINATION
Reviewed chart and spoke with pt about discharge needs/plans. Pt lives with his wife, PTA he was totally independent. Wife able to assist pt as needed. He has a PCP and insurance that covers medications. Denies any needs at this time.

## 2022-03-30 NOTE — PLAN OF CARE
Problem: Infection:  Goal: Will remain free from infection  Description: Will remain free from infection  3/30/2022 1532 by Danika Spring RN  Outcome: Completed  3/30/2022 1335 by Danika Spring RN  Outcome: Ongoing     Problem: Safety:  Goal: Free from accidental physical injury  Description: Free from accidental physical injury  3/30/2022 1532 by Danika Spring RN  Outcome: Completed  3/30/2022 1335 by Danika Spring RN  Outcome: Ongoing  Goal: Free from intentional harm  Description: Free from intentional harm  3/30/2022 1532 by Danika Spring RN  Outcome: Completed  3/30/2022 1335 by Danika Spring RN  Outcome: Ongoing     Problem: Daily Care:  Goal: Daily care needs are met  Description: Daily care needs are met  3/30/2022 1532 by Danika Spring RN  Outcome: Completed  3/30/2022 1335 by Danika Spring RN  Outcome: Ongoing  Goal: Able to ambulate independently  Description: Able to ambulate independently  3/30/2022 1532 by Danika Spring RN  Outcome: Completed  3/30/2022 1335 by Danika Spring RN  Outcome: Ongoing     Problem: Pain:  Goal: Patient's pain/discomfort is manageable  Description: Patient's pain/discomfort is manageable  3/30/2022 1532 by Danika Spring RN  Outcome: Completed  3/30/2022 1335 by Danika Spring RN  Outcome: Ongoing     Problem: Skin Integrity:  Goal: Skin integrity will stabilize  Description: Skin integrity will stabilize  3/30/2022 1532 by Danika Spring RN  Outcome: Completed  3/30/2022 1335 by Danika Spring RN  Outcome: Ongoing     Problem: Falls - Risk of:  Goal: Will remain free from falls  Description: Will remain free from falls  3/30/2022 1532 by Danika Spring RN  Outcome: Completed  3/30/2022 1335 by Danika Spring RN  Outcome: Ongoing  Goal: Absence of physical injury  Description: Absence of physical injury  3/30/2022 1532 by Danika Spring RN  Outcome: Completed  3/30/2022 1335 by Danika Spring RN  Outcome: Ongoing     Problem: Discharge Planning:  Goal: Patients continuum of care needs are met  Description: Patients continuum of care needs are met  3/30/2022 1532 by Lynn Vega RN  Outcome: Completed  3/30/2022 1335 by Lynn Vega, RN  Outcome: Ongoing

## 2022-03-31 ENCOUNTER — CARE COORDINATION (OUTPATIENT)
Dept: CASE MANAGEMENT | Age: 67
End: 2022-03-31

## 2022-03-31 NOTE — DISCHARGE SUMMARY
V2.0  Discharge Summary    Name:  Flaquito Vega /Age/Sex: 1955 (53 y.o. male)   Admit Date: 3/28/2022  Discharge Date: 3/31/22    MRN & CSN:  [id number] & [id number] Encounter Date and Time 3/31/22 9:24 AM EDT    Attending:  No att. providers found Discharging Provider: Dottie Burgess MD       Hospital Course:     Brief HPI: Flaquito Vega is a 78 y.o. male who presented with Abdominal Pain. Brief Problem Based Course:   Small bowel obstruction  Abnormal factor VIII level    The patient was admitted to the hospital on  with abdominal pain. He had exam findings and imaging findings that were consistent with a small bowel obstruction. He reported having a similar episode several years ago which required exploratory surgery but ultimately no etiology for his bowel obstruction was identified at that time. On this occasion, he was kept n.p.o. and managed conservatively. He was seen by general surgery and hematology was also consulted because of a history of venous thromboembolic events. Imaging findings also revealed a short segment of luminal narrowing at the rectosigmoid junction measuring 3 cm without measurable mass lesion. There was also evidence of trace ascites and faint peritoneal nodularity raising the possibility of peritoneal carcinomatosis of unknown primary. These findings however can also be seen in atypical peritonitis. By the next day, the patient was passing gas and had a bowel movement. His abdominal pain had resolved. He was started on clear liquids which he tolerated. The following day he was advanced to a full liquid diet and then a regular diet with no difficulty. He was discharged home with instructions to follow-up with general surgery regarding the short segment luminal narrowing at the rectosigmoid junction and the question of peritoneal nodularity.   He was instructed to resume his rivaroxaban which had been held during his hospitalization and replaced with enoxaparin. The patient expressed appropriate understanding of, and agreement with the discharge recommendations, medications, and plan. Consults this admission:  IP CONSULT TO GENERAL SURGERY  IP CONSULT TO ONCOLOGY    Discharge Diagnosis:   Small bowel obstruction (HCC)    Abnormal findings on CT scan requiring further investigations (short segment rectosigmoid junction narrowing and peritoneal nodularity)    Discharge Instruction:   Follow up appointments: General surgery and PCP  Primary care physician: Roni Candelaria MD within 2 weeks  Diet: regular diet   Activity: activity as tolerated  Disposition: Discharged to:   [x]Home, [][facility], []SNF, []Acute Rehab, []Hospice   Condition on discharge: Stable  Labs and Tests to be Followed up as an outpatient by PCP or Specialist: None    Discharge Medications:        Medication List      CONTINUE taking these medications    Xarelto 10 MG Tabs tablet  Generic drug: rivaroxaban  TAKE 1 TABLET BY MOUTH EVERY DAY           Objective Findings at Discharge:   /71   Pulse 72   Temp 97.9 °F (36.6 °C) (Oral)   Resp 16   Ht 5' 8\" (1.727 m)   Wt 215 lb 14.4 oz (97.9 kg)   SpO2 95%   BMI 32.83 kg/m²       Physical Exam:   Physical Exam          Labs and Imaging   CT ABDOMEN PELVIS WO CONTRAST Additional Contrast? None    Result Date: 3/28/2022  EXAMINATION: CT OF THE ABDOMEN AND PELVIS WITHOUT CONTRAST 3/28/2022 3:16 pm TECHNIQUE: CT of the abdomen and pelvis was performed without the administration of intravenous contrast. Multiplanar reformatted images are provided for review. Dose modulation, iterative reconstruction, and/or weight based adjustment of the mA/kV was utilized to reduce the radiation dose to as low as reasonably achievable.  COMPARISON: 09/30/2017 HISTORY: ORDERING SYSTEM PROVIDED HISTORY: ? SBO TECHNOLOGIST PROVIDED HISTORY: Reason for exam:->? SBO Additional Contrast?->None Decision Support Exception - unselect if not a suspected or confirmed emergency medical condition->Emergency Medical Condition (MA) Reason for Exam: ? SBO Additional signs and symptoms: ? SBO Relevant Medical/Surgical History: ? SBO FINDINGS: Lower Chest: Stable benign micronodule of the right middle lobe. Organs: Liver is normal in contour and attenuation. Cholelithiasis. No biliary ductal diltation. Pancreas is unremarkable. Adrenals are unremarkable. Spleen is normal in size. No renal calculi or hydronephrosis. 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 Vasculature is unremarkable. GI/Bowel: Short segment of luminal narrowing at the rectosigmoid junction measuring 3 cm without measurable mass lesion on series 2, image 132. Multiple mildly dilated loops of small bowel throughout the abdomen with multiple air-fluid levels and discrete transition point at the level of distal ileum in the right hemiabdomen on series 2, image 96. Appendix is normal. Pelvis: Borderline bladder wall thickening with adjacent fat stranding. Mild prostatomegaly. Peritoneum/Retroperitoneum:Trace ascites. New peritoneal thickening and faint peritoneal nodularity along the left pericolic gutter. No free air, organized fluid collection or lymphadenopathy. Bones: Multilevel degenerative disc disease. 1. Small-bowel obstruction with discrete transition point in the right hemiabdomen. 2. Trace ascites with new peritoneal thickening and faint peritoneal nodularity, indeterminate but raises the possibility of peritoneal carcinomatosis of unknown primary. Findings can also be seen in atypical peritonitis. 3. Borderline wall thickening with faint adjacent fat stranding. Recommend correlation with urinalysis. 4. Cholelithiasis. 5. Short segment of luminal narrowing at the rectosigmoid junction measuring 3 cm without measurable mass lesion. Consider further evaluation with flexible sigmoidoscopy if this has not been recently performed.      XR ABDOMEN (KUB) (SINGLE AP VIEW)    Result Date: 3/29/2022  EXAMINATION: ONE SUPINE XRAY VIEW(S) OF THE ABDOMEN 3/29/2022 12:02 am COMPARISON: None. HISTORY: ORDERING SYSTEM PROVIDED HISTORY: NGT placement TECHNOLOGIST PROVIDED HISTORY: Reason for exam:->NGT placement Reason for Exam: abdominal pain FINDINGS: The enteric tube is not visualized. Dilated loops of small bowel are noted. There is no gross free air. Cholelithiasis is present. The enteric tube is not visualized. Cholelithiasis. Dilated loops of small bowel are noted which could be related to small bowel obstruction. XR CHEST PORTABLE    Result Date: 3/29/2022  EXAMINATION: ONE XRAY VIEW OF THE CHEST 3/28/2022 11:52 pm COMPARISON: None. HISTORY: ORDERING SYSTEM PROVIDED HISTORY: RIJ CVC placed TECHNOLOGIST PROVIDED HISTORY: Reason for exam:->RIJ CVC placed Reason for Exam: RIJ CVC placed Additional signs and symptoms: none FINDINGS: The cardiomediastinal silhouette is within normal limits. There is no focal consolidation, pleural effusion, or pneumothorax. There is no evidence of edema. There is no evidence of a central line. No significant findings. Central line is unable to be visualized.        CBC:   Recent Labs     03/28/22  1500 03/29/22  0437 03/30/22  0953   WBC 14.6* 12.7* 8.7   HGB 17.6 15.5 14.8    253 214     BMP:    Recent Labs     03/28/22  1500 03/29/22  0437 03/30/22  0953    140 140   K 4.1 4.3 4.0    106 104   CO2 25 19* 24   BUN 17 18 15   CREATININE 0.8* 0.6* 0.7*   GLUCOSE 115* 106* 99     Hepatic:   Recent Labs     03/28/22  1500   AST 18   ALT 14   BILITOT 1.1*   ALKPHOS 66     Lipids:   Lab Results   Component Value Date    CHOL 166 07/14/2012    HDL 42 12/29/2020    TRIG 70 07/14/2012     Hemoglobin A1C: No results found for: LABA1C  TSH: No results found for: TSH  Troponin:   Lab Results   Component Value Date    TROPONINT <0.010 09/30/2017    TROPONINT <0.010 09/30/2017    TROPONINT <0.010 09/30/2017     Lactic Acid: No results for input(s): LACTA in the last 72 hours. BNP: No results for input(s): PROBNP in the last 72 hours.   UA:  Lab Results   Component Value Date    NITRU NEGATIVE 09/30/2017    COLORU YELLOW 09/30/2017    WBCUA 1 TO 6 09/30/2017    RBCUA 0 TO 1 09/30/2017    MUCUS NEGATIVE 09/30/2017    BACTERIA FEW 09/30/2017    CLARITYU CLEAR 09/30/2017    SPECGRAV 1.025 09/30/2017    LEUKOCYTESUR SMALL 09/30/2017    UROBILINOGEN 0.2 09/30/2017    BILIRUBINUR NEGATIVE 09/30/2017    BLOODU NEGATIVE 09/30/2017    KETUA NEGATIVE 09/30/2017     Urine Cultures: No results found for: LABURIN  Blood Cultures: No results found for: BC  No results found for: BLOODCULT2  Organism: No results found for: ORG    Time Spent Discharging patient 45 minutes    Electronically signed by Dottie Burgess MD on 3/31/2022 at 9:24 AM

## 2022-03-31 NOTE — CARE COORDINATION
[facility] Transitions Initial Follow Up Call    Call within 2 business days of discharge: Yes    Patient: Chrissie Trejo Patient : 1955   MRN: [id number]  Reason for Admission: SBO  Discharge Date: 3/30/22 RARS: Readmission Risk Score: 5.3 ( )      Last Discharge [facility]       Complaint Diagnosis Description Type Department Provider    3/28/22   Admission (Discharged) [facility] 4N Trent Khan MD    3/28/22 Abdominal Pain; Emesis SBO (small bowel obstruction) [facility]) ED (TRANSFER) [address] [place] [address] ED [employer], DO           Spoke with: Patient's spouse    [facility]: [facility] patient for initial Care Transition follow up. Spoke briefly with patient's spouse Alea. She states that Alis Dan went back to work today. She states that he has been doing fine since returning home. She denies Alis Dan having any c/o abdominal pain, nausea/vomiting. Reports that he was able to eat solids last night and tolerated intake. He is moving his bowels. She confirmed he restarted the Xarelto this morning. He does not have any other medications on his list.  Informed her that his appointment with the general surgeon is scheduled for 22 at 9 am.  She states that he will be making a follow up with Dr. Jade Calderon as well. Should also follow up with PCP. Briefly discussed when to contact providers with any new or worsening symptoms. She verbalized understanding. She will let Alis Dan know that CTN called. No questions or needs at this time. Transitions of Care Initial Call    Was this an external facility discharge? No Discharge Facility: [facility]     Challenges to be reviewed by the provider   Additional needs identified to be addressed with provider: No  none             Method of communication with provider : none      Advance Care Planning:   Does patient have an Advance Directive: Not on file     Was this a readmission?  No  Patient stated reason for admission: Abdominal pain, nausea/vomiting  Patients top risk factors for readmission: medical condition-SBO, Elevated factor VIII level    Care Transition Nurse (CTN) contacted the family by telephone to perform post hospital discharge assessment. Verified name and  with family as identifiers. Provided introduction to self, and explanation of the CTN role. Medication reconciliation was performed with family, who verbalizes understanding of administration of home medications. Reviewed and educated family on any new and changed medications related to discharge diagnosis. Was patient discharged with a pulse oximeter? No Discussed and confirmed pulse oximeter discharge instructions and when to notify provider or seek emergency care. CTN provided contact information. Plan for follow-up call in 5-7 days based on severity of symptoms and risk factors.   Plan for next call: symptom management-any new or worsening symptoms (abdominal pain, nausea/vomiting)  follow up appointment-ensure follow up with oncology/hematology and PCP scheduled      Care Transitions 24 Hour Call    Do you have any ongoing symptoms?: No  Do you have a copy of your discharge instructions?: Yes  Do you have all of your prescriptions and are they filled?: Yes  Have you been contacted by a [facility] Pharmacist?: No  Have you scheduled your follow up appointment?: Yes  Were you discharged with any Home Care or Post Acute Services: No  Do you feel like you have everything you need to keep you well at home?: Yes  Care Transitions Interventions         Follow Up  Future Appointments   Date Time Provider Jayna Nair   2022  9:00 AM Phillip Rae DO [address] [facility]   2022  4:30 PM MD Romaine De La Vega [address] [facility]       Trent Mcneill RN

## 2022-04-06 ENCOUNTER — CARE COORDINATION (OUTPATIENT)
Dept: CASE MANAGEMENT | Age: 67
End: 2022-04-06

## 2022-04-06 NOTE — CARE COORDINATION
[facility] Transitions Follow Up Call    2022    Patient: Marbin Dhaliwal  Patient : 1955   MRN: [id number]  Reason for Admission: SBO  Discharge Date: 3/30/22 RARS: Readmission Risk Score: 5.3 ( )         Spoke with: Italo Strange, patient    Contacted patient for Care Transition follow up. Spoke very briefly with patient. Mr. Edward Bailey states that he is doing okay. Denies having any c/o abdominal pain, nausea/vomiting. Reports he is eating and drinking fluids w/o issues. States he is moving his bowels. He has restarted Xarelto. Denies having any abnormal bleeding. Will be following up with the general surgeon on 22. No questions or needs at this time. Care Transitions Follow Up Call    Needs to be reviewed by the provider   Additional needs identified to be addressed with provider: No  none             Method of communication with provider : none      Care Transition Nurse (CTN) contacted the patient by telephone to follow up after admission on 3/28/22-3/30/22. Verified name and  with patient as identifiers. Addressed changes since last contact: none  Discussed follow-up appointments. If no appointment was previously scheduled, appointment scheduling offered: Yes. Is follow up appointment scheduled within 7 days of discharge? No.    Patients top risk factors for readmission: medical condition-SBO, Elevated factor VIII level  Interventions to address risk factors: Scheduled appointment with Specialist-Has an appointment scheduled for 22    CTN provided contact information for future needs. Plan for follow-up call in 7-10 days based on severity of symptoms and risk factors.   Plan for next call: symptom management-any new or worsening symptoms    Care Transitions Subsequent and Final Call    Subsequent and Final Calls  Do you have any ongoing symptoms?: No  Do you currently have any active services?: No  Do you have any needs or concerns that I can assist you with?: No  Care Transitions Interventions  Other Interventions:            Follow Up  Future Appointments   Date Time Provider Jayna Nair   4/13/2022  9:00 AM Lizett Nunn DO [address] [facility]   4/18/2022  2:00 PM [facility], MED ONC NURSE [facility] MED ONC Bernardo Colinsakina   4/18/2022  2:15 PM Patrick Reed MD [facility]   5/31/2022  4:30 PM Pedro Bliss MD [facility]  MMA Nilsa Essex, RN

## 2022-04-07 NOTE — PROGRESS NOTES
Patient Name: Mariana Bishop  Patient : 1955  Patient MRN: [id number]     Primary Oncologist: Patrick Reed MD  Referring Provider: Pedro Bliss MD     Date of Service: 2022      Chief Complaint:   Chief Complaint   Patient presents with    Follow-up     He came in for follow up after discharge from hospital.     Patient Active Problem List:     Pulmonary embolism      Elevated PSA     Lung nodule, solitary     Elevated factor VIII level     Neck mass     Small bowel obstruction ([address]     SBO (small bowel obstruction) ([address]     HPI:    Michelle Chilel is a pleasant 78 y.o. male with significant past medical history of unprovoked PE in 2017 who was admitted with SBO in 2022. He was found to have elevated factor VIII at 399. He was recommended by Dr. Yin Quezada to have long-term anticoagulation. Last time he was seen by Dr. Yin Quezada was in 2020. He was recommended to continue to follow-up with family doctor and to continue with oral anticoagulation. He denied any previous history of blood clot. Denied any family history of blood clot. No underlying malignancy. Reportedly he had colonoscopy in [facility].     CT abdomen pelvis on 2022 showed  1. Small-bowel obstruction with discrete transition point in the right  hemiabdomen. 2. Trace ascites with new peritoneal thickening and faint peritoneal  nodularity, indeterminate but raises the possibility of peritoneal  carcinomatosis of unknown primary.  Findings can also be seen in atypical peritonitis. 3. Borderline wall thickening with faint adjacent fat stranding.  Recommend correlation with urinalysis. 4. Cholelithiasis.   5. Short segment of luminal narrowing at the rectosigmoid junction measuring 3 cm without measurable mass lesion.  Consider further evaluation with flexible sigmoidoscopy if this has not been recently performed.     On 2022 he came in with the spouse for follow-up visit after discharge from the kg)   SpO2 98%   BMI 32.87 kg/m²     Physical Exam:  CONSTITUTIONAL: awake, alert, cooperative, no apparent distress   EYES: pupils equal, round and reactive to light, sclera clear and conjunctiva normal  ENT: Normocephalic, without obvious abnormality, atraumatic  NECK: supple, symmetrical, no jugular venous distension and no carotid bruits   HEMATOLOGIC/LYMPHATIC: no cervical, supraclavicular or axillary lymphadenopathy   LUNGS: no increased work of breathing and clear to auscultation   CARDIOVASCULAR: regular rate and rhythm, normal S1 and S2, no murmur  ABDOMEN: normal bowel sound, soft, non-distended, non-tender, no masses palpated, no hepatosplenomegaly   MUSCULOSKELETAL: full range of motion noted, tone is normal  NEUROLOGIC: Motor grossly intact. CN II - XII grossly intact. SKIN: Normal skin color, texture, turgor and no jaundice. appears intact   EXTREMITIES: no LE edema or cyanosis. Homans negative.     Labs:  Hematology:  Lab Results   Component Value Date    WBC 8.7 03/30/2022    RBC 4.66 03/30/2022    HGB 14.8 03/30/2022    HCT 43.7 03/30/2022    MCV 93.8 03/30/2022    MCH 31.8 (H) 03/30/2022    MCHC 33.9 03/30/2022    RDW 12.2 03/30/2022     03/30/2022    MPV 9.8 03/30/2022    BANDSPCT 14 (H) 11/18/2010    SEGSPCT 70.1 (H) 03/30/2022    EOSRELPCT 2.5 03/30/2022    BASOPCT 0.3 03/30/2022    LYMPHOPCT 20.3 (L) 03/30/2022    MONOPCT 6.6 (H) 03/30/2022    BANDABS 1.67 11/18/2010    SEGSABS 6.1 03/30/2022    EOSABS 0.2 03/30/2022    BASOSABS 0.0 03/30/2022    LYMPHSABS 1.8 03/30/2022    MONOSABS 0.6 03/30/2022    DIFFTYPE AUTOMATED DIFFERENTIAL 03/30/2022     Lab Results   Component Value Date    ESR 22 (H) 09/30/2017     Chemistry:  Lab Results   Component Value Date     03/30/2022    K 4.0 03/30/2022     03/30/2022    CO2 24 03/30/2022    BUN 15 03/30/2022    CREATININE 0.7 (L) 03/30/2022    GLUCOSE 99 03/30/2022    CALCIUM 8.7 03/30/2022    PROT 7.6 03/28/2022    LABALBU 4.7 03/28/2022    BILITOT 1.1 (H) 03/28/2022    ALKPHOS 66 03/28/2022    AST 18 03/28/2022    ALT 14 03/28/2022    LABGLOM >60 03/30/2022    GFRAA >60 03/30/2022    MG 2.1 07/14/2012     Lab Results   Component Value Date    HOMOCYSTEINE 8.6 09/30/2017     Immunology:  Lab Results   Component Value Date    PROT 7.6 03/28/2022     Coagulation Panel:  Lab Results   Component Value Date    PROTIME 13.1 09/30/2017    INR 1.14 09/30/2017    APTT 35.6 09/30/2017     Tumor Markers:  Lab Results   Component Value Date    CEA 3.3 09/30/2017    PSA 6.3 (H) 01/11/2018      Observations:  PHQ-9 Total Score: 0 (4/18/2022  2:02 PM)     Assessment & Plan:  1. He has history of unprovoked PE in 2017. At that time he was seen by Dr. Kandy Meckel. He was found to have elevated factor VIII level. He was recommended to continue with long-term anticoagulation. Last time he saw Dr. Kandy Meckel was in July 2020. He has been followed by family doctor at present time. He is currently on DOAC.     2.  I reviewed his CAT scan of abdomen with spouse and him today in the cancer center. He is scheduled for colonoscopy on April 25, 2022. Return to clinic after the colonoscopy. All of his question has been answered for today. Recent imaging and labs were reviewed and discussed with the patient.

## 2022-04-13 ENCOUNTER — OFFICE VISIT (OUTPATIENT)
Dept: SURGERY | Age: 67
End: 2022-04-13
Payer: COMMERCIAL

## 2022-04-13 VITALS
OXYGEN SATURATION: 96 % | HEART RATE: 63 BPM | WEIGHT: 215 LBS | SYSTOLIC BLOOD PRESSURE: 132 MMHG | DIASTOLIC BLOOD PRESSURE: 80 MMHG | HEIGHT: 68 IN | BODY MASS INDEX: 32.58 KG/M2

## 2022-04-13 DIAGNOSIS — K56.609 SBO (SMALL BOWEL OBSTRUCTION) (HCC): Primary | ICD-10-CM

## 2022-04-13 DIAGNOSIS — Z12.11 COLON CANCER SCREENING: ICD-10-CM

## 2022-04-13 PROCEDURE — 99204 OFFICE O/P NEW MOD 45 MIN: CPT | Performed by: SURGERY

## 2022-04-13 PROCEDURE — 1111F DSCHRG MED/CURRENT MED MERGE: CPT | Performed by: SURGERY

## 2022-04-13 ASSESSMENT — ENCOUNTER SYMPTOMS
ANAL BLEEDING: 0
COLOR CHANGE: 0
NAUSEA: 0
CONSTIPATION: 0
TROUBLE SWALLOWING: 0
BACK PAIN: 0
SORE THROAT: 0
COUGH: 0
CHOKING: 0
VOMITING: 0
DIARRHEA: 1
BLOOD IN STOOL: 0
ABDOMINAL DISTENTION: 0
STRIDOR: 0
ABDOMINAL PAIN: 0
SHORTNESS OF BREATH: 0

## 2022-04-13 NOTE — H&P (VIEW-ONLY)
SUBJECTIVE:  HPI:     Patient had recent admission for small bowel suction. This resolved fairly quickly with conservative management. Patient does have history of small bowel obstruction which had resolved with conservative management but due to no prior abdominal history underwent exploratory laparotomy with negative findings. On CT as well as small bowel suction he had a narrowing at the rectosigmoid junction. Patient's last colonoscopy was 2013 and was significant for polyps. Father had colon cancer at age 79. Denies personal family history of IBD. He takes Xarelto for history of PE. Since discharge from hospital he did have one episode of diarrhea. Otherwise last bowel movement today was normal.  Denies blood in stool, melena, unexplained weight loss, change in caliber of stool. Past Surgical History:   Procedure Laterality Date    ABDOMEN SURGERY      exploratory for obstruction. No blockage. No resection    COLONOSCOPY  2013    pt thinks it was in 2013 by Dr Wandy Almeida in [place]. Past Medical History:   Diagnosis Date    Ankle fracture     treated with cast    Bowel obstruction (HCC)     Due to ileus.  Chest pain 7/2012    admitted in hospital MI r/o, pt refused stress test.    Elevated factor VIII level 11/20/2017    Elevated PSA 10/10/2017    Histoplasmosis     lungs. Seen  pulmonologist lung Bx    Lung nodule, solitary 11/14/2017    CT chest 9/2017: Right middle lobe 0.6 cm nodule. F/u CT chest in 12 months    Pulmonary embolism ([address] 9/30/2017    Unprovoked. No DVT both legs. Factor VIII elevation. Sent to him on Dr. Maxene Carrel for recommendations.   He recommended prolonged anticoagulation as it is unprovoked pulmonary embolism     Family History   Problem Relation Age of Onset    Colon Cancer Father 80     Social History     Socioeconomic History    Marital status:      Spouse name: Not on file    Number of children: 0    Years of education: 14    Highest education level: Not on file   Occupational History     Comment:    Tobacco Use    Smoking status: Never Smoker    Smokeless tobacco: Never Used   Substance and Sexual Activity    Alcohol use: No    Drug use: No    Sexual activity: Yes     Partners: Female   Other Topics Concern    Not on file   Social History Narrative    Not on file     Social Determinants of Health     Financial Resource Strain:     Difficulty of Paying Living Expenses: Not on file   Food Insecurity:     Worried About Running Out of Food in the Last Year: Not on file    Katie of Food in the Last Year: Not on file   Transportation Needs:     Lack of Transportation (Medical): Not on file    Lack of Transportation (Non-Medical): Not on file   Physical Activity:     Days of Exercise per Week: Not on file    Minutes of Exercise per Session: Not on file   Stress:     Feeling of Stress : Not on file   Social Connections:     Frequency of Communication with Friends and Family: Not on file    Frequency of Social Gatherings with Friends and Family: Not on file    Attends [employer]regation Services: Not on file    Active Member of [address] [place] [address] or Organizations: Not on file    Attends Club or Organization Meetings: Not on file    Marital Status: Not on file   Intimate Partner Violence:     Fear of Current or Ex-Partner: Not on file    Emotionally Abused: Not on file    Physically Abused: Not on file    Sexually Abused: Not on file   Housing Stability:     Unable to Pay for Housing in the Last Year: Not on file    Number of [place] in the Last Year: Not on file    Unstable Housing in the Last Year: Not on file       Current Outpatient Medications   Medication Sig Dispense Refill    XARELTO 10 MG TABS tablet TAKE 1 TABLET BY MOUTH EVERY DAY 90 tablet 1     No current facility-administered medications for this visit.       No Known Allergies    Review of Systems:         Review of Systems   Constitutional: Negative for chills, fatigue, fever and unexpected weight change. HENT: Negative for sore throat and trouble swallowing. Respiratory: Negative for cough, choking, shortness of breath and stridor. Cardiovascular: Negative for chest pain, palpitations and leg swelling. Gastrointestinal: Positive for diarrhea. Negative for abdominal distention, abdominal pain, anal bleeding, blood in stool, constipation, nausea and vomiting. Musculoskeletal: Negative for back pain, gait problem and joint swelling. Skin: Negative for color change, rash and wound. Allergic/Immunologic: Negative for immunocompromised state. Neurological: Negative for dizziness, speech difficulty, weakness and light-headedness. Hematological: Negative for adenopathy. Does not bruise/bleed easily. Psychiatric/Behavioral: Negative for agitation and confusion. The patient is not nervous/anxious. OBJECTIVE:  Physical Exam:    /80 (Site: Right Upper Arm, Position: Sitting, Cuff Size: Large Adult)   Pulse 63   Ht 5' 8\" (1.727 m)   Wt 215 lb (97.5 kg)   SpO2 96%   BMI 32.69 kg/m²      Physical Exam  General: No acute respiratory distress  Neck: No JVD, supple  Lungs: Clear to auscultation, chest rise equal bilaterally  Cardiac: RRR  Abdomen: Obese abdomen, soft, nontender, nondistended, no rebound or guarding  Extremities, no edema, cyanosis, or clubbing  Skin: No rashes or breakdown  Neurologic: cranial nerves II - XII grossly intact    ASSESSMENT:  1. SBO (small bowel obstruction) ([facility]    2. Encounter for screening colonoscopy          PLAN:    We will plan for colonoscopy    Discussed risks and benefits of colonoscopy. Risks discussed include, but not limited to, risk of anesthesia including cardiopulmonary compromise, bleeding, perforation, need for additional surgeries. Patient states understanding and agrees to proceed with colonoscopy. No orders of the defined types were placed in this encounter.        No orders of the defined types were placed in this encounter. Follow Up:  No follow-ups on file.       Agustin Perez DO

## 2022-04-13 NOTE — PATIENT INSTRUCTIONS
Patient Education        Learning About Colonoscopy  What is a colonoscopy? A colonoscopy is a test (also called a procedure) that lets a doctor look inside your large intestine. The doctor uses a thin, lighted tube called a colonoscope. The doctor uses it to look for small growths called polyps, colonor rectal cancer (colorectal cancer), or other problems like bleeding. During the procedure, the doctor can take samples of tissue. The samples can then be checked for cancer or other conditions. The doctor can also take outpolyps. How is a colonoscopy done? This procedure is done in a doctor's office or a clinic or hospital. You will get medicine to help you relax and not feel pain. Some people find that theydon't remember having the test because of the medicine. The doctor gently moves the colonoscope, or scope, through the colon. The scope is also a small video camera. It lets the doctor see the colon and takepictures. How do you prepare for the procedure? You need to clean out your colon before the procedure so the doctor can seeyour colon. This depends on which \"colon prep\" your doctor recommends. To clean out your colon, you'll do a \"colon prep\" before the test. This means you stop eating solid foods and drink only clear liquids. You can have water, tea, coffee, clear juices, clear broths, flavored ice pops, and gelatin (suchas Jell-O). Do not drink anything red or purple. The day or night before the procedure, you drink a large amount of a special liquid. This causes loose, frequent stools. You will go to the bathroom a lot. Your doctor may have you drink part of the liquid the evening before and the rest on the day of the test. It's very important to drink all of the liquid. Ifyou have problems drinking it, call your doctor. Arrange to have someone take you home after the test.  What can you expect after a colonoscopy? Your doctor will tell you when you can eat and do your usual activities.   Drink a lot of fluid after the test to replace the fluids you may have lostduring the colon prep. But don't drink alcohol. Your doctor will talk to you about when you'll need your next colonoscopy. The results of your test and your risk for colorectal cancer will help your doctordecide how often you need to be checked. After the test, you may be bloated or have gas pains. You may need to pass gas. If a biopsy was done or a polyp was removed, you may have streaks of blood in your stool (feces) for a few days. Check with your doctor to see when it issafe to take aspirin and nonsteroidal anti-inflammatory drugs (NSAIDs) again. Problems such as heavy rectal bleeding may not occur until several weeks afterthe test. This isn't common. But it can happen after polyps are removed. Follow-up care is a key part of your treatment and safety. Be sure to make and go to all appointments, and call your doctor if you are having problems. It's also a good idea to know your test results and keep alist of the medicines you take. Where can you learn more? Go to [email or web address] org and sign in to your [email or web address] account. Enter V127 in the [facility] box to learn more about \"Learning About Colonoscopy. \"     If you do not have an account, please click on the \"Sign Up Now\" link. Current as of: September 8, 2021               Content Version: 13.2  © 5934-5249 Healthwise, Incorporated. Care instructions adapted under license by [facility] [place] ([facility]). If you have questions about a medical condition or this instruction, always ask your healthcare professional. [place] [address] any warranty or liability for your use of this information.

## 2022-04-13 NOTE — H&P
SUBJECTIVE:  HPI:     Patient had recent admission for small bowel suction. This resolved fairly quickly with conservative management. Patient does have history of small bowel obstruction which had resolved with conservative management but due to no prior abdominal history underwent exploratory laparotomy with negative findings. On CT as well as small bowel suction he had a narrowing at the rectosigmoid junction. Patient's last colonoscopy was 2013 and was significant for polyps. Father had colon cancer at age 79. Denies personal family history of IBD. He takes Xarelto for history of PE. Since discharge from hospital he did have one episode of diarrhea. Otherwise last bowel movement today was normal.  Denies blood in stool, melena, unexplained weight loss, change in caliber of stool. Past Surgical History:   Procedure Laterality Date    ABDOMEN SURGERY      exploratory for obstruction. No blockage. No resection    COLONOSCOPY  2013    pt thinks it was in 2013 by Dr Christina Patterson in [place]. Past Medical History:   Diagnosis Date    Ankle fracture     treated with cast    Bowel obstruction (HCC)     Due to ileus.  Chest pain 7/2012    admitted in hospital MI r/o, pt refused stress test.    Elevated factor VIII level 11/20/2017    Elevated PSA 10/10/2017    Histoplasmosis     lungs. Seen  pulmonologist lung Bx    Lung nodule, solitary 11/14/2017    CT chest 9/2017: Right middle lobe 0.6 cm nodule. F/u CT chest in 12 months    Pulmonary embolism ([address] 9/30/2017    Unprovoked. No DVT both legs. Factor VIII elevation. Sent to him on Dr. Alexandre Dodge for recommendations.   He recommended prolonged anticoagulation as it is unprovoked pulmonary embolism     Family History   Problem Relation Age of Onset    Colon Cancer Father 80     Social History     Socioeconomic History    Marital status:      Spouse name: Not on file    Number of children: 0    Years of education: 14    Highest education level: Not on file   Occupational History     Comment:    Tobacco Use    Smoking status: Never Smoker    Smokeless tobacco: Never Used   Substance and Sexual Activity    Alcohol use: No    Drug use: No    Sexual activity: Yes     Partners: Female   Other Topics Concern    Not on file   Social History Narrative    Not on file     Social Determinants of Health     Financial Resource Strain:     Difficulty of Paying Living Expenses: Not on file   Food Insecurity:     Worried About Running Out of Food in the Last Year: Not on file    Katie of Food in the Last Year: Not on file   Transportation Needs:     Lack of Transportation (Medical): Not on file    Lack of Transportation (Non-Medical): Not on file   Physical Activity:     Days of Exercise per Week: Not on file    Minutes of Exercise per Session: Not on file   Stress:     Feeling of Stress : Not on file   Social Connections:     Frequency of Communication with Friends and Family: Not on file    Frequency of Social Gatherings with Friends and Family: Not on file    Attends [employer] Services: Not on file    Active Member of [address] [place] [address] or Organizations: Not on file    Attends Club or Organization Meetings: Not on file    Marital Status: Not on file   Intimate Partner Violence:     Fear of Current or Ex-Partner: Not on file    Emotionally Abused: Not on file    Physically Abused: Not on file    Sexually Abused: Not on file   Housing Stability:     Unable to Pay for Housing in the Last Year: Not on file    Number of [place] in the Last Year: Not on file    Unstable Housing in the Last Year: Not on file       Current Outpatient Medications   Medication Sig Dispense Refill    XARELTO 10 MG TABS tablet TAKE 1 TABLET BY MOUTH EVERY DAY 90 tablet 1     No current facility-administered medications for this visit.       No Known Allergies    Review of Systems:         Review of Systems   Constitutional: Negative for chills, fatigue, fever and unexpected weight change. HENT: Negative for sore throat and trouble swallowing. Respiratory: Negative for cough, choking, shortness of breath and stridor. Cardiovascular: Negative for chest pain, palpitations and leg swelling. Gastrointestinal: Positive for diarrhea. Negative for abdominal distention, abdominal pain, anal bleeding, blood in stool, constipation, nausea and vomiting. Musculoskeletal: Negative for back pain, gait problem and joint swelling. Skin: Negative for color change, rash and wound. Allergic/Immunologic: Negative for immunocompromised state. Neurological: Negative for dizziness, speech difficulty, weakness and light-headedness. Hematological: Negative for adenopathy. Does not bruise/bleed easily. Psychiatric/Behavioral: Negative for agitation and confusion. The patient is not nervous/anxious. OBJECTIVE:  Physical Exam:    /80 (Site: Right Upper Arm, Position: Sitting, Cuff Size: Large Adult)   Pulse 63   Ht 5' 8\" (1.727 m)   Wt 215 lb (97.5 kg)   SpO2 96%   BMI 32.69 kg/m²      Physical Exam  General: No acute respiratory distress  Neck: No JVD, supple  Lungs: Clear to auscultation, chest rise equal bilaterally  Cardiac: RRR  Abdomen: Obese abdomen, soft, nontender, nondistended, no rebound or guarding  Extremities, no edema, cyanosis, or clubbing  Skin: No rashes or breakdown  Neurologic: cranial nerves II - XII grossly intact    ASSESSMENT:  1. SBO (small bowel obstruction) ([facility] [address] [facility]    2. Encounter for screening colonoscopy          PLAN:    We will plan for colonoscopy    Discussed risks and benefits of colonoscopy. Risks discussed include, but not limited to, risk of anesthesia including cardiopulmonary compromise, bleeding, perforation, need for additional surgeries. Patient states understanding and agrees to proceed with colonoscopy. No orders of the defined types were placed in this encounter.        No orders of the defined types were placed in this encounter. Follow Up:  No follow-ups on file.       Fawn Ontiveros DO

## 2022-04-14 ENCOUNTER — TELEPHONE (OUTPATIENT)
Dept: SURGERY | Age: 67
End: 2022-04-14

## 2022-04-14 NOTE — TELEPHONE ENCOUNTER
PT TO CALL BACK TO CLARIFY PREP, [address]    SPOKE TO  [facility] ([address]) SCHEDULED @ [facility].  NOTIFIED OF DATES, TIMES AND LOCATION    PHONE ASSESSMENT   SURGERY - 4/25/22 @ 451  P/O - 5/4/22 @ 1015    NPO AFTER MIDNIGHT  [facility] 4/17/22    HOLD BLOOD THINNERS    SENT

## 2022-04-15 ENCOUNTER — CARE COORDINATION (OUTPATIENT)
Dept: CASE MANAGEMENT | Age: 67
End: 2022-04-15

## 2022-04-15 NOTE — PROGRESS NOTES
Patient will arrive at [facility] at [facility] on 4/25/2022 for his procedure at [address] [place] [address].               1. Do not eat or drink anything after midnight - unless instructed by your doctor prior to surgery. This includes                   no water, chewing gum or mints. 2. Follow your directions as prescribed by the doctor for your procedure and medications. 3. Check with your Doctor regarding stopping vitamins, supplements, blood thinners (Plavix, Coumadin, Lovenox, Effient, Pradaxa, Xarelto, Fragmin or                   other blood thinners) and follow their instructions. 4. Do not smoke, and do not drink any alcoholic beverages 24 hours prior to surgery. This includes NA Beer. 5. You may brush your teeth and gargle the morning of surgery. DO NOT SWALLOW WATER   6. You MUST make arrangements for a responsible adult to take you home after your surgery and be able to check on you every couple                   hours for the day. You will not be allowed to leave alone or drive yourself home. It is strongly suggested someone stay with you the first 24                   hrs. Your surgery will be cancelled if you do not have a ride home. 7. Please wear simple, loose fitting clothing to the hospital.  Geovanna Rmteodora not bring valuables (money, credit cards, checkbooks, etc.) Do not wear any                   makeup (including no eye makeup) or nail polish on your fingers or toes. 8. DO NOT wear any jewelry or piercings on day of surgery. All body piercing jewelry must be removed. 9. If you have dentures, they will be removed before going to the OR; we will provide you a container. If you wear contact lenses or glasses,                  they will be removed; please bring a case for them. 10. If you  have a Living Will and Durable Power of  for Healthcare, please bring in a copy.            11. Please bring picture ID,  insurance card, paperwork from the doctors office    (H & P, Consent, & card for implantable devices). 12. Take a shower the morning of your procedure with Hibiclens or an anti-bacterial soap. Do not apply any deodorant, lotion, oil or powder. 13.  Enter thru the main entrance wearing a mask on the day of surgery. covid screening done. Patient was confused about his prep, he had spoken with [facility] at Dr Oralia Tam office, on 4/14/2022 but still was not sure. I called the office and spoke with [facility] and she stated that prep was miralax only mixed with gatorade. I called patient back to let him know and he voiced understanding.

## 2022-04-15 NOTE — CARE COORDINATION
[facility] Transitions Follow Up Call    4/15/2022    Patient: Prince Denson  Patient : 1955   MRN: [id number]  Reason for Admission: SBO  Discharge Date: 3/30/22 RARS: Readmission Risk Score: 5.3 ( )         Spoke with: Christopher Hennessy, patient    Contacted patient for care transition follow up. Spoke very briefly with patient who was short with his response. Mr. Jason Fairly states that he is fine. Denies having any abdominal pain, nausea/vomiting, shortness of breath. He is eating and drinking fluids w/o issues. He is moving his bowels. Has upcoming procedure (colonoscopy) on 22. He is aware of when to contact providers. No questions or needs at this time. No further outreach. Care Transitions Follow Up Call    Needs to be reviewed by the provider   Additional needs identified to be addressed with provider: No  none             Method of communication with provider : none      Care Transition Nurse (CTN) contacted the patient by telephone to follow up after admission on 3/28/22-3/30/22. Verified name and  with patient as identifiers. Addressed changes since last contact: will be having a colonoscopy on 22  Discussed follow-up appointments. If no appointment was previously scheduled, appointment scheduling offered: Yes. Is follow up appointment scheduled within 7 days of discharge? No.    Patients top risk factors for readmission: medical condition-SBO, Elevated Factor VIII level  Interventions to address risk factors: Education of patient/family/caregiver/guardian to support self-management-aware of when to contact provider    CTN provided contact information for future needs. No further follow-up call indicated based on severity of symptoms and risk factors.       Care Transitions Subsequent and Final Call    Subsequent and Final Calls  Do you have any ongoing symptoms?: No  Do you currently have any active services?: No  Do you have any needs or concerns that I can assist you with?: No  Care Transitions Interventions  Other Interventions:            Follow Up  Future Appointments   Date Time Provider Jayna Nair   4/18/2022  2:00 PM [facility] MED ONC NURSE [facility] MED ONC [place]   4/18/2022  2:15 PM Roslyn Mcallister MD [facility]   5/4/2022 10:15 AM Glendy DeversDO [facility] KATHIE [facility]   5/31/2022  4:30 PM Shannon Vazquez MD [facility]  JESSICA Yu, RN

## 2022-04-18 ENCOUNTER — OFFICE VISIT (OUTPATIENT)
Dept: ONCOLOGY | Age: 67
End: 2022-04-18
Payer: COMMERCIAL

## 2022-04-18 ENCOUNTER — HOSPITAL ENCOUNTER (OUTPATIENT)
Dept: INFUSION THERAPY | Age: 67
Discharge: HOME | End: 2022-04-18

## 2022-04-18 VITALS
WEIGHT: 216.2 LBS | HEIGHT: 68 IN | SYSTOLIC BLOOD PRESSURE: 142 MMHG | OXYGEN SATURATION: 98 % | BODY MASS INDEX: 32.77 KG/M2 | RESPIRATION RATE: 18 BRPM | TEMPERATURE: 98.4 F | HEART RATE: 74 BPM | DIASTOLIC BLOOD PRESSURE: 68 MMHG

## 2022-04-18 DIAGNOSIS — I26.99 OTHER PULMONARY EMBOLISM WITHOUT ACUTE COR PULMONALE, UNSPECIFIED CHRONICITY (HCC): Primary | ICD-10-CM

## 2022-04-18 PROCEDURE — 99213 OFFICE O/P EST LOW 20 MIN: CPT | Performed by: INTERNAL MEDICINE

## 2022-04-18 ASSESSMENT — PATIENT HEALTH QUESTIONNAIRE - PHQ9
SUM OF ALL RESPONSES TO PHQ QUESTIONS 1-9: 0
SUM OF ALL RESPONSES TO PHQ QUESTIONS 1-9: 0
SUM OF ALL RESPONSES TO PHQ9 QUESTIONS 1 & 2: 0
SUM OF ALL RESPONSES TO PHQ QUESTIONS 1-9: 0
1. LITTLE INTEREST OR PLEASURE IN DOING THINGS: 0
2. FEELING DOWN, DEPRESSED OR HOPELESS: 0
SUM OF ALL RESPONSES TO PHQ QUESTIONS 1-9: 0

## 2022-04-18 NOTE — PROGRESS NOTES
MA Rooming Questions  Patient: Austin Richey  MRN: [id number]    Date: 4/18/2022        1. Do you have any new issues?   no         2. Do you need any refills on medications?    no    3. Have you had any imaging done since your last visit? yes -     4. Have you been hospitalized or seen in the emergency room since your last visit here?   yes -     5. Did the patient have a depression screening completed today?  Yes    No data recorded     PHQ-9 Given to (if applicable):               PHQ-9 Score (if applicable):                     [] Positive     []  Negative              Does question #9 need addressed (if applicable)                     [] Yes    []  No               Julian Rios CMA

## 2022-04-19 NOTE — PROGRESS NOTES
Patient Name: Jenny Tan  Patient : 1955  Patient MRN: [id number]     Primary Oncologist: Zacarias Medina MD  Referring Provider: Kvng Johansen MD     Date of Service: 2022      Chief Complaint:   Chief Complaint   Patient presents with    Follow-up     He came in for follow up visit. Patient Active Problem List:     Pulmonary embolism      Elevated PSA     Lung nodule, solitary     Elevated factor VIII level     Neck mass    HPI:    Remington Hernández is a pleasant 78 y.o. male with significant past medical history of unprovoked PE in 2017 who was admitted with SBO in 2022. He was found to have elevated factor VIII at 399. He was recommended by Dr. Saurabh Guerin to have long-term anticoagulation. Last time he was seen by Dr. Saurabh Guerin was in 2020. He was recommended to continue to follow-up with family doctor and to continue with oral anticoagulation. He denied any previous history of blood clot. Denied any family history of blood clot. No underlying malignancy. Reportedly he had colonoscopy in [facility].     CT abdomen pelvis on 2022 showed  1. Small-bowel obstruction with discrete transition point in the right  yoselyn abdomen. 2. Trace ascites with new peritoneal thickening and faint peritoneal  nodularity, indeterminate but raises the possibility of peritoneal  carcinomatosis of unknown primary.  Findings can also be seen in atypical peritonitis. 3. Borderline wall thickening with faint adjacent fat stranding.  Recommend correlation with urinalysis. 4. Cholelithiasis. 5. Short segment of luminal narrowing at the rectosigmoid junction measuring 3 cm without measurable mass lesion.  Consider further evaluation with flexible sigmoidoscopy if this has not been recently performed.     On 2022 he came in with the spouse for follow-up visit after discharge from the hospital.  He is scheduled for colonoscopy on 2022.   We went over the finding on the CAT scan.  I will see him back after the colonoscopy. He will hold the anticoagulation for 2 days prior to the colonoscopy. He denied any bowel habit changes. On 5/9/2022 he came in for follow up visit. 4/25/2022 he has colonsocopy:  Final Pathologic Diagnosis:   A.  Hyperplastic polyp, 3 mm. sessile splenic flexure polyp:   B.  Hyperplastic polyp, distal rectum.    Follow up in 5 years due to family history of colon cancer. I offered follow up CT abdomen in 2 - 4 weeks vs PET scan. Also I offer urinalysis. He refused further w/u for peritoneal nodularity and bladder wall thickening. He was seen by urologist at one time. If he changes his mind, I advise to follow up with me or call me. He will follow up with PCP. He denied any hematuria or dysuria. No abdominal pain. No acute pain. Denied any nausea, vomiting or diarrhea. No fever or chills. No chest pain, shortness of breath or palpitation. No headache or dizzy spell. No specific bone pain. No melena or hematochezia. Past Medical History:     Ankle fracture      treated with cast   Bowel obstruction  Due to ileus.  Chest pain 7/2012 admitted in hospital MI r/o, pt refused stress test.   Elevated factor VIII level 11/20/2017   Elevated PSA 10/10/2017   Histoplasmosis      lungs. Seen  pulmonologist lung Bx   Lung nodule, solitary 11/14/2017    CT chest 9/2017: Right middle lobe 0.6 cm nodule. F/u CT chest in 12 months   Pulmonary embolism ([address] 9/30/2017    Unprovoked. No DVT both legs. Factor VIII elevation. Sent to him on Dr. Fabiola Harmon for recommendations. He recommended prolonged anticoagulation as it is unprovoked pulmonary embolism     Past Surgical History:     ABDOMEN SURGERY        exploratory for obstruction. No blockage. No resection   COLONOSCOPY   2013    pt thinks it was in 2013 by Dr Cary Benitez in [place]. Social History:    TOBACCO:   reports that he has never smoked.  He has never used smokeless tobacco.  ETOH: reports no history of alcohol use. DRUGS:   reports no history of drug use. Family History:    Colon Cancer Father 80    REVIEW OF SYSTEMS:    The remainder of the review of system is unremarkable. Review of Systems: \"Per interval history; otherwise 10 point ROS is negative. \"     Vital Signs:  BP (!) 142/63 (Site: Right Upper Arm, Position: Sitting, Cuff Size: Medium Adult)   Pulse 68   Temp 98.3 °F (36.8 °C) (Infrared)   Wt 217 lb 6.4 oz (98.6 kg)   SpO2 97%   BMI 33.06 kg/m²     Physical Exam:  CONSTITUTIONAL: awake, alert, cooperative, no apparent distress   EYES: pupils equal, round and reactive to light, sclera clear and conjunctiva normal  ENT: Normocephalic, without obvious abnormality, atraumatic  NECK: supple, symmetrical, no jugular venous distension and no carotid bruits   HEMATOLOGIC/LYMPHATIC: no cervical, supraclavicular or axillary lymphadenopathy   LUNGS: no increased work of breathing and clear to auscultation   CARDIOVASCULAR: regular rate and rhythm, normal S1 and S2, no murmur  ABDOMEN: normal bowel sound, soft, non-distended, non-tender, no masses palpated, no hepatosplenomegaly   MUSCULOSKELETAL: full range of motion noted, tone is normal  NEUROLOGIC: Motor grossly intact. CN II - XII grossly intact. SKIN: Normal skin color, texture, turgor and no jaundice. appears intact   EXTREMITIES: no LE edema or cyanosis. Homans negative.     Labs:  Hematology:  Lab Results   Component Value Date    WBC 8.7 03/30/2022    RBC 4.66 03/30/2022    HGB 14.8 03/30/2022    HCT 43.7 03/30/2022    MCV 93.8 03/30/2022    MCH 31.8 (H) 03/30/2022    MCHC 33.9 03/30/2022    RDW 12.2 03/30/2022     03/30/2022    MPV 9.8 03/30/2022    BANDSPCT 14 (H) 11/18/2010    SEGSPCT 70.1 (H) 03/30/2022    EOSRELPCT 2.5 03/30/2022    BASOPCT 0.3 03/30/2022    LYMPHOPCT 20.3 (L) 03/30/2022    MONOPCT 6.6 (H) 03/30/2022    BANDABS 1.67 11/18/2010    SEGSABS 6.1 03/30/2022    EOSABS 0.2 03/30/2022    BASOSABS 0.0 03/30/2022    LYMPHSABS 1.8 03/30/2022    MONOSABS 0.6 03/30/2022    DIFFTYPE AUTOMATED DIFFERENTIAL 03/30/2022     Lab Results   Component Value Date    ESR 22 (H) 09/30/2017     Chemistry:  Lab Results   Component Value Date     03/30/2022    K 4.0 03/30/2022     03/30/2022    CO2 24 03/30/2022    BUN 15 03/30/2022    CREATININE 0.7 (L) 03/30/2022    GLUCOSE 99 03/30/2022    CALCIUM 8.7 03/30/2022    PROT 7.6 03/28/2022    LABALBU 4.7 03/28/2022    BILITOT 1.1 (H) 03/28/2022    ALKPHOS 66 03/28/2022    AST 18 03/28/2022    ALT 14 03/28/2022    LABGLOM >60 03/30/2022    GFRAA >60 03/30/2022    MG 2.1 07/14/2012     Lab Results   Component Value Date    HOMOCYSTEINE 8.6 09/30/2017     Immunology:  Lab Results   Component Value Date    PROT 7.6 03/28/2022     Coagulation Panel:  Lab Results   Component Value Date    PROTIME 13.1 09/30/2017    INR 1.14 09/30/2017    APTT 35.6 09/30/2017     Tumor Markers:  Lab Results   Component Value Date    CEA 3.3 09/30/2017    PSA 6.3 (H) 01/11/2018      Observations:  No data recorded     Assessment & Plan:  1. He has history of unprovoked PE in 2017. At that time he was seen by Dr. Marybeth Skinner. He was found to have elevated factor VIII level. He was recommended to continue with long-term anticoagulation. Last time he saw Dr. Marybeth Skinner was in July 2020. He has been followed by family doctor at present time. He is currently on DOAC.     2.  I reviewed his CAT scan of abdomen with spouse and him. He had colonoscopy on April 25, 2022 and 2 small hyperplastic polyps were removed. Follow up colonoscopy in 5 years was recommended due to family history of colon cancer. 3. I offered follow up CT abdomen and pelvis within 2 - 4 weeks vs PET scan and urinalysis. He refused the w/u. If he changes his mind, I advise to call me. As per his request I will follow up PRN and strongly recommend to follow up with PCP and surgeon.     All of his question has been answered for today. Recent imaging and labs were reviewed and discussed with the patient.

## 2022-04-21 ENCOUNTER — ANESTHESIA EVENT (OUTPATIENT)
Dept: ENDOSCOPY | Age: 67
End: 2022-04-21
Payer: COMMERCIAL

## 2022-04-22 NOTE — ANESTHESIA PRE PROCEDURE
Department of Anesthesiology  Preprocedure Note       Name:  Marisol Napier   Age:  78 y.o.  :  1955                                          MRN:  [id number]         Date:  2022      Surgeon: Elaine Madrigal):  Deion Albarran DO    Procedure: Procedure(s):  COLORECTAL CANCER SCREENING, NOT HIGH RISK    Medications prior to admission:   Prior to Admission medications    Medication Sig Start Date End Date Taking? Authorizing Provider   XARELTO 10 MG TABS tablet TAKE 1 TABLET BY MOUTH EVERY DAY 21   Mikki Aponte MD       Current medications:    No current facility-administered medications for this encounter. Current Outpatient Medications   Medication Sig Dispense Refill    XARELTO 10 MG TABS tablet TAKE 1 TABLET BY MOUTH EVERY DAY 90 tablet 1       Allergies:  No Known Allergies    Problem List:    Patient Active Problem List   Diagnosis Code    Pulmonary embolism (HCC) I26.99    Elevated PSA R97.20    Lung nodule, solitary R91.1    Elevated factor VIII level R79.1    Neck mass R22.1    Small bowel obstruction ([address] K56.609    SBO (small bowel obstruction) ([address] K56.609    Colon cancer screening Z12.11       Past Medical History:        Diagnosis Date    Ankle fracture     treated with cast    Bowel obstruction ([address]     Due to ileus.  Chest pain 2012    admitted in hospital [place] r/o, pt refused stress test.    Elevated factor VIII level 2017    Elevated PSA 10/10/2017    Histoplasmosis     lungs. Seen  pulmonologist lung Bx    Lung nodule, solitary 2017    CT chest 2017: Right middle lobe 0.6 cm nodule. F/u CT chest in 12 months    Pulmonary embolism ([address] 2017    Unprovoked. No DVT both legs. Factor VIII elevation. Sent to him on Dr. Pelon Bradshaw for recommendations. He recommended prolonged anticoagulation as it is unprovoked pulmonary embolism       Past Surgical History:        Procedure Laterality Date    ABDOMEN SURGERY      exploratory for obstruction. No blockage. No resection    COLONOSCOPY  2013    pt thinks it was in 2013 by Dr Shelia Hinojosa in [place]. Social History:    Social History     Tobacco Use    Smoking status: Never Smoker    Smokeless tobacco: Never Used   Substance Use Topics    Alcohol use: No                                Counseling given: Not Answered      Vital Signs (Current):   Vitals:    04/15/22 1203   Weight: 218 lb (98.9 kg)   Height: 5' 8\" (1.727 m)                                              BP Readings from Last 3 Encounters:   04/18/22 (!) 142/68   04/13/22 132/80   03/30/22 134/71       NPO Status:                                                                                 BMI:   Wt Readings from Last 3 Encounters:   04/18/22 216 lb 3.2 oz (98.1 kg)   04/13/22 215 lb (97.5 kg)   03/30/22 215 lb 14.4 oz (97.9 kg)     Body mass index is 33.15 kg/m². CBC:   Lab Results   Component Value Date    WBC 8.7 03/30/2022    RBC 4.66 03/30/2022    HGB 14.8 03/30/2022    HCT 43.7 03/30/2022    MCV 93.8 03/30/2022    RDW 12.2 03/30/2022     03/30/2022       CMP:   Lab Results   Component Value Date     03/30/2022    K 4.0 03/30/2022     03/30/2022    CO2 24 03/30/2022    BUN 15 03/30/2022    CREATININE 0.7 03/30/2022    GFRAA >60 03/30/2022    LABGLOM >60 03/30/2022    GLUCOSE 99 03/30/2022    PROT 7.6 03/28/2022    PROT 7.6 07/14/2012    CALCIUM 8.7 03/30/2022    BILITOT 1.1 03/28/2022    ALKPHOS 66 03/28/2022    AST 18 03/28/2022    ALT 14 03/28/2022       POC Tests: No results for input(s): POCGLU, POCNA, POCK, POCCL, POCBUN, POCHEMO, POCHCT in the last 72 hours.     Coags:   Lab Results   Component Value Date    PROTIME 13.1 09/30/2017    INR 1.14 09/30/2017    APTT 35.6 09/30/2017       HCG (If Applicable): No results found for: PREGTESTUR, PREGSERUM, HCG, HCGQUANT     ABGs: No results found for: PHART, PO2ART, FTX8NNZ, LTC5FAP, BEART, N9LXXTCJ     Type & Screen (If Applicable):  No results found for: LABABO, [address]    Drug/Infectious Status (If Applicable):  No results found for: HIV, HEPCAB    COVID-19 Screening (If Applicable):   Lab Results   Component Value Date    COVID19 NOT DETECTED 03/28/2022           Anesthesia Evaluation  Patient summary reviewed  Airway:         Dental:          Pulmonary:                             ROS comment: Lung nodule, solitary  Histoplasmosis   Cardiovascular:                      Neuro/Psych:               GI/Hepatic/Renal:   (+) bowel prep,           Endo/Other:    (+) blood dyscrasia: anticoagulation therapy:., .                 Abdominal:             Vascular:   + PE. Other Findings:             Anesthesia Plan      MAC     ASA 3     (Chart review 4/22/22)  Induction: intravenous.                           [place]les [place], APRN - CRNA   4/22/2022

## 2022-04-25 ENCOUNTER — HOSPITAL ENCOUNTER (OUTPATIENT)
Age: 67
Setting detail: OUTPATIENT SURGERY
Discharge: HOME | End: 2022-04-25
Attending: SURGERY | Admitting: SURGERY
Payer: COMMERCIAL

## 2022-04-25 ENCOUNTER — ANESTHESIA (OUTPATIENT)
Dept: ENDOSCOPY | Age: 67
End: 2022-04-25
Payer: COMMERCIAL

## 2022-04-25 VITALS
BODY MASS INDEX: 33.04 KG/M2 | TEMPERATURE: 97.8 F | WEIGHT: 218 LBS | OXYGEN SATURATION: 96 % | HEIGHT: 68 IN | SYSTOLIC BLOOD PRESSURE: 120 MMHG | DIASTOLIC BLOOD PRESSURE: 74 MMHG | HEART RATE: 61 BPM | RESPIRATION RATE: 18 BRPM

## 2022-04-25 VITALS — SYSTOLIC BLOOD PRESSURE: 118 MMHG | OXYGEN SATURATION: 97 % | DIASTOLIC BLOOD PRESSURE: 70 MMHG

## 2022-04-25 DIAGNOSIS — I26.99 OTHER PULMONARY EMBOLISM WITHOUT ACUTE COR PULMONALE, UNSPECIFIED CHRONICITY (HCC): ICD-10-CM

## 2022-04-25 DIAGNOSIS — Z12.11 COLON CANCER SCREENING: ICD-10-CM

## 2022-04-25 PROCEDURE — 88305 TISSUE EXAM BY PATHOLOGIST: CPT

## 2022-04-25 PROCEDURE — 2580000003 HC RX 258: Performed by: SURGERY

## 2022-04-25 PROCEDURE — 7100000011 HC PHASE II RECOVERY - ADDTL 15 MIN: Performed by: SURGERY

## 2022-04-25 PROCEDURE — 7100000010 HC PHASE II RECOVERY - FIRST 15 MIN: Performed by: SURGERY

## 2022-04-25 PROCEDURE — 6360000002 HC RX W HCPCS: Performed by: NURSE ANESTHETIST, CERTIFIED REGISTERED

## 2022-04-25 PROCEDURE — 3609010600 HC COLONOSCOPY POLYPECTOMY SNARE/COLD BIOPSY: Performed by: SURGERY

## 2022-04-25 PROCEDURE — 3700000000 HC ANESTHESIA ATTENDED CARE: Performed by: SURGERY

## 2022-04-25 PROCEDURE — 2709999900 HC NON-CHARGEABLE SUPPLY: Performed by: SURGERY

## 2022-04-25 PROCEDURE — 2500000003 HC RX 250 WO HCPCS: Performed by: NURSE ANESTHETIST, CERTIFIED REGISTERED

## 2022-04-25 PROCEDURE — 45380 COLONOSCOPY AND BIOPSY: CPT | Performed by: SURGERY

## 2022-04-25 PROCEDURE — 3700000001 HC ADD 15 MINUTES (ANESTHESIA): Performed by: SURGERY

## 2022-04-25 RX ORDER — LIDOCAINE HYDROCHLORIDE 20 MG/ML
INJECTION, SOLUTION EPIDURAL; INFILTRATION; INTRACAUDAL; PERINEURAL PRN
Status: DISCONTINUED | OUTPATIENT
Start: 2022-04-25 | End: 2022-04-25 | Stop reason: SDUPTHER

## 2022-04-25 RX ORDER — SODIUM CHLORIDE, SODIUM LACTATE, POTASSIUM CHLORIDE, CALCIUM CHLORIDE 600; 310; 30; 20 MG/100ML; MG/100ML; MG/100ML; MG/100ML
INJECTION, SOLUTION INTRAVENOUS CONTINUOUS
Status: DISCONTINUED | OUTPATIENT
Start: 2022-04-25 | End: 2022-04-25 | Stop reason: HOSPADM

## 2022-04-25 RX ORDER — PROPOFOL 10 MG/ML
INJECTION, EMULSION INTRAVENOUS PRN
Status: DISCONTINUED | OUTPATIENT
Start: 2022-04-25 | End: 2022-04-25 | Stop reason: SDUPTHER

## 2022-04-25 RX ADMIN — LIDOCAINE HYDROCHLORIDE 100 MG: 20 INJECTION, SOLUTION EPIDURAL; INFILTRATION; INTRACAUDAL; PERINEURAL at 09:01

## 2022-04-25 RX ADMIN — SODIUM CHLORIDE, POTASSIUM CHLORIDE, SODIUM LACTATE AND CALCIUM CHLORIDE: 600; 310; 30; 20 INJECTION, SOLUTION INTRAVENOUS at 07:45

## 2022-04-25 RX ADMIN — PROPOFOL 200 MG: 10 INJECTION, EMULSION INTRAVENOUS at 09:17

## 2022-04-25 RX ADMIN — PROPOFOL 200 MG: 10 INJECTION, EMULSION INTRAVENOUS at 09:07

## 2022-04-25 RX ADMIN — PROPOFOL 200 MG: 10 INJECTION, EMULSION INTRAVENOUS at 09:01

## 2022-04-25 ASSESSMENT — PAIN SCALES - GENERAL
PAINLEVEL_OUTOF10: 0

## 2022-04-25 NOTE — PROGRESS NOTES
1010 In to check on pt. Pt denies c/o or needs. Call light in reach. 1012 Discharge instructions given to pt and wife. Both verbalized understanding of instructions. 1020 Pt up to side of bed with assist. Pt tolerated well and ready to get dressed to go home. Wife at bedside to assist.  Call light in reach. 1030 Pt discharged to home per wheelchair to wife's awaiting vehicle to drive pt home.

## 2022-04-25 NOTE — ANESTHESIA PRE PROCEDURE
Department of Anesthesiology  Preprocedure Note       Name:  Laly Vivas   Age:  78 y.o.  :  1955                                          MRN:  [id number]         Date:  2022      Surgeon: Zee El):  Tari Church, DO    Procedure: Procedure(s):  COLORECTAL CANCER SCREENING, NOT HIGH RISK    Medications prior to admission:   Prior to Admission medications    Medication Sig Start Date End Date Taking? Authorizing Provider   XARELTO 10 MG TABS tablet TAKE 1 TABLET BY MOUTH EVERY DAY 21   Milena Briggs MD       Current medications:    Current Facility-Administered Medications   Medication Dose Route Frequency Provider Last Rate Last Admin    lactated ringers infusion   IntraVENous Continuous Tari Sor,  mL/hr at 2245 New Bag at 22       Allergies:  No Known Allergies    Problem List:    Patient Active Problem List   Diagnosis Code    Pulmonary embolism (HCC) I26.99    Elevated PSA R97.20    Lung nodule, solitary R91.1    Elevated factor VIII level R79.1    Neck mass R22.1    Small bowel obstruction ([address] K56.609    SBO (small bowel obstruction) ([address] K56.609    Colon cancer screening Z12.11       Past Medical History:        Diagnosis Date    Ankle fracture     treated with cast    Bowel obstruction ([address]     Due to ileus.  Chest pain 2012    admitted in hospital [place] r/o, pt refused stress test.    Elevated factor VIII level 2017    Elevated PSA 10/10/2017    Histoplasmosis     lungs. Seen  pulmonologist lung Bx    Lung nodule, solitary 2017    CT chest 2017: Right middle lobe 0.6 cm nodule. F/u CT chest in 12 months    Pulmonary embolism ([address] 2017    Unprovoked. No DVT both legs. Factor VIII elevation. Sent to him on Dr. Jareth Lora for recommendations.   He recommended prolonged anticoagulation as it is unprovoked pulmonary embolism       Past Surgical History:        Procedure Laterality Date    ABDOMEN SURGERY exploratory for obstruction. No blockage. No resection    COLONOSCOPY  2013    pt thinks it was in 2013 by Dr Deirdre Rodgers in [place]. Social History:    Social History     Tobacco Use    Smoking status: Never Smoker    Smokeless tobacco: Never Used   Substance Use Topics    Alcohol use: No                                Counseling given: Not Answered      Vital Signs (Current):   Vitals:    04/15/22 1203 04/25/22 0708   BP:  (!) 141/63   Pulse:  71   Resp:  20   Temp:  36.4 °C (97.5 °F)   TempSrc:  Temporal   SpO2:  96%   Weight: 218 lb (98.9 kg)    Height: 5' 8\" (1.727 m)                                               BP Readings from Last 3 Encounters:   04/25/22 (!) 141/63   04/18/22 (!) 142/68   04/13/22 132/80       NPO Status:                                                                                 BMI:   Wt Readings from Last 3 Encounters:   04/15/22 218 lb (98.9 kg)   04/18/22 216 lb 3.2 oz (98.1 kg)   04/13/22 215 lb (97.5 kg)     Body mass index is 33.15 kg/m². CBC:   Lab Results   Component Value Date    WBC 8.7 03/30/2022    RBC 4.66 03/30/2022    HGB 14.8 03/30/2022    HCT 43.7 03/30/2022    MCV 93.8 03/30/2022    RDW 12.2 03/30/2022     03/30/2022       CMP:   Lab Results   Component Value Date     03/30/2022    K 4.0 03/30/2022     03/30/2022    CO2 24 03/30/2022    BUN 15 03/30/2022    CREATININE 0.7 03/30/2022    GFRAA >60 03/30/2022    LABGLOM >60 03/30/2022    GLUCOSE 99 03/30/2022    PROT 7.6 03/28/2022    PROT 7.6 07/14/2012    CALCIUM 8.7 03/30/2022    BILITOT 1.1 03/28/2022    ALKPHOS 66 03/28/2022    AST 18 03/28/2022    ALT 14 03/28/2022       POC Tests: No results for input(s): POCGLU, POCNA, POCK, POCCL, POCBUN, POCHEMO, POCHCT in the last 72 hours.     Coags:   Lab Results   Component Value Date    PROTIME 13.1 09/30/2017    INR 1.14 09/30/2017    APTT 35.6 09/30/2017       HCG (If Applicable): No results found for: PREGTESTUR, PREGSERUM, HCG, HCGQUANT ABGs: No results found for: PHART, PO2ART, CNY0VPH, QFO1GLU, BEART, B0OVDVCG     Type & Screen (If Applicable):  No results found for: LABABO, LABRH    Drug/Infectious Status (If Applicable):  No results found for: HIV, HEPCAB    COVID-19 Screening (If Applicable):   Lab Results   Component Value Date    COVID19 NOT DETECTED 03/28/2022           Anesthesia Evaluation  Patient summary reviewed no history of anesthetic complications:   Airway: Mallampati: II  TM distance: >3 FB   Neck ROM: full  Mouth opening: > = 3 FB Dental: normal exam         Pulmonary:                             ROS comment: Lung nodule, solitary  Histoplasmosis   Cardiovascular:  Exercise tolerance: good (>4 METS),            Beta Blocker:  Not on Beta Blocker         Neuro/Psych:   Negative Neuro/Psych ROS              GI/Hepatic/Renal:   (+) bowel prep,           Endo/Other:    (+) blood dyscrasia: anticoagulation therapy:., .                 Abdominal:       Abdomen: soft. Vascular:   + PE. Other Findings:           Anesthesia Plan      MAC     ASA 2       Induction: intravenous. Anesthetic plan and risks discussed with patient. Plan discussed with CRNA. Pre Anesthesia Evaluation complete. Anesthesia plan, risks, benefits, alternatives, and personal involved discussed with patient. Patients and/or legal guardian verbalized an understanding  and agreed to proceed.   Tiffanie Blankenship DO  4/25/2022

## 2022-04-25 NOTE — OP NOTE
Operative Note      Patient: Rayray Croft  YOB: 1955  MRN: [id number]    Date of Procedure: 4/25/2022    Pre-Op Diagnosis: Colon cancer screening [Z12.11]    Post-Op Diagnosis:  3mm splenic flexure polyp. 2 mm rectal polyp. Procedure(s):  COLONOSCOPY POLYPECTOMY SNARE/COLD BIOPSY    Surgeon(s):  Ester Kilpatrick DO    Assistant:   * No surgical staff found *    Anesthesia: Monitor Anesthesia Care    Estimated Blood Loss (mL): Minimal    Complications: None    Specimens:   ID Type Source Tests Collected by Time Destination   A : regular forceps Tissue Colon SURGICAL PATHOLOGY Ester Kilpatrick DO 4/25/2022 0923    B : regular forceps Tissue Colon SURGICAL PATHOLOGY Ester FinesseDO 4/25/2022 9847        Implants:  * No implants in log *      Drains: * No LDAs found *    Findings: see post op dx. Detailed Description of Procedure:         INDICATIONS: CT findings with possible narrowing at rectosigmoid junction. TECHNIQUE:  The patient was brought to the endoscopy suite and was placed on the endoscopy cart in the left lateral decubitus position. The patient was continuously monitored with pulse oximetry, heart rate and blood pressure monitors. Informed consent had previously been obtained in the office setting. After the adequate sedation, digital rectal examination was performed. Findings:   Good rectal tone, no masses or polyps, prostate exam normal.    Next, the Olympus digital colonoscope was inserted into the rectum and air was insufflated. The scope was advanced to the cecum without difficulty. The scope was passed into the pit of the cecum. The prep was good. The ileocecal valve and appendiceal orifice were within normal limits. There was excellent finger imprint in the cecum. The scope was gradually withdrawn with circumferential visualization of the colonic mucosa performed on a valve by valve basis.   Using a standard slow withdrawal technique, the scope was brought back through the entire colon. The scope was retroflexed. The scope was anteflexed and the colon was decompressed and withdrawn without difficulty. The patient tolerated the procedure well and recovered uneventfully at the conclusion of the case.     Findings:   3 mm sessile splenic flexure polyp-polypectomy with cold forceps  2 mm distal rectal polyp-polypectomy with cold forceps        Electronically signed by Ester Kilpatrick DO on 4/25/2022 at 9:28 AM

## 2022-04-25 NOTE — PROGRESS NOTES
Patient is back to baseline. Report handoff given to [address] [place] [address] ([facility]). Wife, Jules July at bedside.

## 2022-04-25 NOTE — PROGRESS NOTES
Patient is awake, alert and oriented. Preop questions reviewed and verified. H&P and consent completed. Report received from [address] ([facility]). Wife, Rigoberto Pierre at bedside.

## 2022-04-25 NOTE — ANESTHESIA POSTPROCEDURE EVALUATION
Department of Anesthesiology  Postprocedure Note    Patient: Rayray Croft  MRN: [id number]  YOB: 1955  Date of evaluation: 4/25/2022  Time:  9:45 AM     Procedure Summary     Date: 04/25/22 Room / Location: [address] [place] [address]    Anesthesia Start: 2511 Anesthesia Stop: 9770    Procedure: COLONOSCOPY POLYPECTOMY SNARE/COLD BIOPSY (N/A ) Diagnosis:       Colon cancer screening      (Colon cancer screening [Z12.11])    Surgeons: Ester Kilpatrick DO Responsible Provider: Dawn Grier DO    Anesthesia Type: MAC ASA Status: 2          Anesthesia Type: MAC    Demetris Phase I:      Demetris Phase II:      Last vitals: Reviewed and per EMR flowsheets.        Anesthesia Post Evaluation    Patient participation: complete - patient participated  Level of consciousness: responsive to verbal stimuli  Pain score: 0  Airway patency: patent  Nausea & Vomiting: no vomiting and no nausea  Complications: no  Cardiovascular status: blood pressure returned to baseline and hemodynamically stable  Respiratory status: nonlabored ventilation, spontaneous ventilation and room air  Hydration status: stable

## 2022-04-25 NOTE — PROGRESS NOTES
0911 Pt received from [place] and report from [facility] Pt denies c/o. Pt abdomen soft and non tender. Wife at bedside. Call light in reach. Pt given water.

## 2022-04-25 NOTE — INTERVAL H&P NOTE
Update History & Physical    The patient's History and Physical of April 13, 2022 was reviewed with the patient and I examined the patient. There was no change. The surgical site was confirmed by the patient and me. Plan: The risks, benefits, expected outcome, and alternative to the recommended procedure have been discussed with the patient. Patient understands and wants to proceed with the procedure.      Electronically signed by Yanni Rubio DO on 4/25/2022 at 8:19 AM

## 2022-05-04 ENCOUNTER — OFFICE VISIT (OUTPATIENT)
Dept: SURGERY | Age: 67
End: 2022-05-04

## 2022-05-04 VITALS
SYSTOLIC BLOOD PRESSURE: 134 MMHG | BODY MASS INDEX: 32.74 KG/M2 | HEART RATE: 85 BPM | WEIGHT: 216 LBS | DIASTOLIC BLOOD PRESSURE: 76 MMHG | HEIGHT: 68 IN

## 2022-05-04 DIAGNOSIS — K63.5 HYPERPLASTIC POLYP OF LARGE INTESTINE: Primary | ICD-10-CM

## 2022-05-04 PROCEDURE — 99024 POSTOP FOLLOW-UP VISIT: CPT | Performed by: SURGERY

## 2022-05-04 NOTE — PROGRESS NOTES
Chief Complaint   Patient presents with    Follow-up     1st F/U Colonoscopy @ [address] 4/25/22         SUBJECTIVE:  Patient here for post op visit. Had recent admission with small bowel obstruction. CT also showing narrowing at rectosigmoid junction. Small bowel suction resolved with conservative management. He had colonoscopy to follow-up on CT findings. Rectosigmoid junction widely patent. No stricture no narrowing no significant diverticulosis/Diverticular disease. Polyps x2 were both hyperplastic polyps. He does have a family history with father having colon cancer. Recommend repeat colonoscopy in 5 years. Also had trace ascites with peritoneal thickening and faint peritoneal nodularity which was indeterminant. Oncology was consulted in hospital and he has an appointment next week with them. Past Surgical History:   Procedure Laterality Date    ABDOMEN SURGERY      exploratory for obstruction. No blockage. No resection    COLONOSCOPY  2013    pt thinks it was in 2013 by Dr Nanci Vasquez in [address] [facility] [address] COLONOSCOPY N/A 4/25/2022    COLONOSCOPY POLYPECTOMY SNARE/COLD BIOPSY performed by Beverley Rae DO at [facility] ENDOSCOPY     Past Medical History:   Diagnosis Date    Ankle fracture     treated with cast    Bowel obstruction ([address]     Due to ileus.  Chest pain 7/2012    admitted in hospital MI r/o, pt refused stress test.    Elevated factor VIII level 11/20/2017    Elevated PSA 10/10/2017    Histoplasmosis     lungs. Seen  pulmonologist lung Bx    Lung nodule, solitary 11/14/2017    CT chest 9/2017: Right middle lobe 0.6 cm nodule. F/u CT chest in 12 months    Pulmonary embolism ([address] 9/30/2017    Unprovoked. No DVT both legs. Factor VIII elevation. Sent to him on Dr. Marilee Og for recommendations.   He recommended prolonged anticoagulation as it is unprovoked pulmonary embolism     Family History   Problem Relation Age of Onset    Colon Cancer Father 80     Social History Socioeconomic History    Marital status:      Spouse name: Not on file    Number of children: 0    Years of education: 15    Highest education level: Not on file   Occupational History     Comment:    Tobacco Use    Smoking status: Never Smoker    Smokeless tobacco: Never Used   Vaping Use    Vaping Use: Never used   Substance and Sexual Activity    Alcohol use: No    Drug use: No    Sexual activity: Yes     Partners: Female   Other Topics Concern    Not on file   Social History Narrative    Not on file     Social Determinants of Health     Financial Resource Strain:     Difficulty of Paying Living Expenses: Not on file   Food Insecurity:     Worried About Running Out of Food in the Last Year: Not on file    Katie of Food in the Last Year: Not on file   Transportation Needs:     Lack of Transportation (Medical): Not on file    Lack of Transportation (Non-Medical):  Not on file   Physical Activity:     Days of Exercise per Week: Not on file    Minutes of Exercise per Session: Not on file   Stress:     Feeling of Stress : Not on file   Social Connections:     Frequency of Communication with Friends and Family: Not on file    Frequency of Social Gatherings with Friends and Family: Not on file    Attends Jew Services: Not on file    Active Member of [address] [place] [address] [email or web address] or Organizations: Not on file    Attends Club or Organization Meetings: Not on file    Marital Status: Not on file   Intimate Partner Violence:     Fear of Current or Ex-Partner: Not on file    Emotionally Abused: Not on file    Physically Abused: Not on file    Sexually Abused: Not on file   Housing Stability:     Unable to Pay for Housing in the Last Year: Not on file    Number of [place] in the Last Year: Not on file    Unstable Housing in the Last Year: Not on file       OBJECTIVE:    General: A&O x3  Respiratory: Chest rise equal bilaterally  CV: Regular rate and rhythm  Abdomen: Soft, nontender, nondistended, no rebound or guarding. Path reveals:   Final Pathologic Diagnosis:   A.  Hyperplastic polyp, 3 mm. sessile splenic flexure polyp:     B.  Hyperplastic polyp, distal rectum. ASSESSMENT:    1. Hyperplastic polyp of large intestine          PLAN:    Recommend repeat colonoscopy in 5 years. We will schedule for colonoscopy in 5 years. Follow-up oncology for recommendations on CT findings. If further symptoms in regards to some of obstruction may need diagnostic laparoscopy or small bowel follow-through but for now we will watch. No orders of the defined types were placed in this encounter. No orders of the defined types were placed in this encounter. Follow Up: No follow-ups on file.     Tari Church DO

## 2022-05-09 ENCOUNTER — HOSPITAL ENCOUNTER (OUTPATIENT)
Dept: INFUSION THERAPY | Age: 67
Discharge: HOME | End: 2022-05-09

## 2022-05-09 ENCOUNTER — OFFICE VISIT (OUTPATIENT)
Dept: ONCOLOGY | Age: 67
End: 2022-05-09
Payer: COMMERCIAL

## 2022-05-09 VITALS
HEART RATE: 68 BPM | SYSTOLIC BLOOD PRESSURE: 142 MMHG | TEMPERATURE: 98.3 F | BODY MASS INDEX: 33.06 KG/M2 | DIASTOLIC BLOOD PRESSURE: 63 MMHG | WEIGHT: 217.4 LBS | OXYGEN SATURATION: 97 %

## 2022-05-09 DIAGNOSIS — K56.609 SMALL BOWEL OBSTRUCTION (HCC): Primary | ICD-10-CM

## 2022-05-09 PROCEDURE — 1123F ACP DISCUSS/DSCN MKR DOCD: CPT | Performed by: INTERNAL MEDICINE

## 2022-05-09 PROCEDURE — G8427 DOCREV CUR MEDS BY ELIG CLIN: HCPCS | Performed by: INTERNAL MEDICINE

## 2022-05-09 PROCEDURE — 3017F COLORECTAL CA SCREEN DOC REV: CPT | Performed by: INTERNAL MEDICINE

## 2022-05-09 PROCEDURE — 99214 OFFICE O/P EST MOD 30 MIN: CPT | Performed by: INTERNAL MEDICINE

## 2022-05-09 PROCEDURE — 4040F PNEUMOC VAC/ADMIN/RCVD: CPT | Performed by: INTERNAL MEDICINE

## 2022-05-09 PROCEDURE — G8417 CALC BMI ABV UP PARAM F/U: HCPCS | Performed by: INTERNAL MEDICINE

## 2022-05-09 PROCEDURE — 1036F TOBACCO NON-USER: CPT | Performed by: INTERNAL MEDICINE

## 2022-05-09 NOTE — PROGRESS NOTES
MA Rooming Questions  Patient: Debra Arredondo  MRN: [id number]    Date: 5/9/2022        1. Do you have any new issues?   no         2. Do you need any refills on medications?    no    3. Have you had any imaging done since your last visit?   no    4. Have you been hospitalized or seen in the emergency room since your last visit here?   no    5. Did the patient have a depression screening completed today?  No    No data recorded     PHQ-9 Given to (if applicable):               PHQ-9 Score (if applicable):                     [] Positive     []  Negative              Does question #9 need addressed (if applicable)                     [] Yes    []  No               Jaime Telles CMA

## 2022-05-13 PROBLEM — Z12.11 COLON CANCER SCREENING: Status: RESOLVED | Noted: 2022-04-13 | Resolved: 2022-05-13

## 2022-05-31 ENCOUNTER — OFFICE VISIT (OUTPATIENT)
Dept: INTERNAL MEDICINE CLINIC | Age: 67
End: 2022-05-31
Payer: COMMERCIAL

## 2022-05-31 VITALS
WEIGHT: 214.6 LBS | BODY MASS INDEX: 32.63 KG/M2 | HEART RATE: 72 BPM | RESPIRATION RATE: 16 BRPM | SYSTOLIC BLOOD PRESSURE: 128 MMHG | TEMPERATURE: 97.4 F | DIASTOLIC BLOOD PRESSURE: 72 MMHG | OXYGEN SATURATION: 96 %

## 2022-05-31 DIAGNOSIS — R79.1 ELEVATED FACTOR VIII LEVEL: ICD-10-CM

## 2022-05-31 DIAGNOSIS — I26.99 OTHER PULMONARY EMBOLISM WITHOUT ACUTE COR PULMONALE, UNSPECIFIED CHRONICITY (HCC): Primary | ICD-10-CM

## 2022-05-31 DIAGNOSIS — R97.20 ELEVATED PSA: ICD-10-CM

## 2022-05-31 DIAGNOSIS — R91.1 LUNG NODULE, SOLITARY: ICD-10-CM

## 2022-05-31 DIAGNOSIS — K56.609 SMALL BOWEL OBSTRUCTION (HCC): ICD-10-CM

## 2022-05-31 DIAGNOSIS — R93.5 ABNORMAL CT OF THE ABDOMEN: ICD-10-CM

## 2022-05-31 DIAGNOSIS — K56.609 SBO (SMALL BOWEL OBSTRUCTION) (HCC): ICD-10-CM

## 2022-05-31 PROCEDURE — 1123F ACP DISCUSS/DSCN MKR DOCD: CPT | Performed by: INTERNAL MEDICINE

## 2022-05-31 PROCEDURE — 99213 OFFICE O/P EST LOW 20 MIN: CPT | Performed by: INTERNAL MEDICINE

## 2022-05-31 ASSESSMENT — PATIENT HEALTH QUESTIONNAIRE - PHQ9
SUM OF ALL RESPONSES TO PHQ QUESTIONS 1-9: 0
SUM OF ALL RESPONSES TO PHQ9 QUESTIONS 1 & 2: 0
2. FEELING DOWN, DEPRESSED OR HOPELESS: 0
SUM OF ALL RESPONSES TO PHQ QUESTIONS 1-9: 0
SUM OF ALL RESPONSES TO PHQ QUESTIONS 1-9: 0
1. LITTLE INTEREST OR PLEASURE IN DOING THINGS: 0
SUM OF ALL RESPONSES TO PHQ QUESTIONS 1-9: 0

## 2022-05-31 ASSESSMENT — ENCOUNTER SYMPTOMS
ABDOMINAL PAIN: 0
ANAL BLEEDING: 0

## 2022-05-31 NOTE — PROGRESS NOTES
Bhupinder Rushing  Patient's  is 1955  Seen in office on 2022      SUBJECTIVE:  Ca Farmer jori 78 y. o.year old male presents today   Chief Complaint   Patient presents with    Follow-up     Pt is here for f/u of PE. Anticoagulation, recent SBO  Pt was admitted to [facility] for SBO that resolved with conservative treatment. Pt had abdominal pain. Pt states he had SBO several years ago that required exploration and nothing serious at that time  This time small bowel obstruction resolved with conservative treatment. CT scan of the abdomen had shown SBO, trace ascites, peritoneal thickening and faint peritoneal nodularity indeterminate but raises the possibility of peritoneal carcinomatosis of unknown primary. Findings can also be seen with atypical peritonitis borderline wall thickening with faint adjacent fatty stranding. Recommended correlation with urinalysis, cholelithiasis it also showed short segment of luminal narrowing at the rectosigmoid junction    Taking medications regularly. No side effects noted. Review of Systems   Gastrointestinal: Negative for abdominal pain and anal bleeding. Genitourinary: Negative. Negative for decreased urine volume, dysuria, enuresis, flank pain, frequency, genital sores, hematuria, penile discharge, testicular pain and urgency. OBJECTIVE: /72   Pulse 72   Temp 97.4 °F (36.3 °C) (Temporal)   Resp 16   Wt 214 lb 9.6 oz (97.3 kg)   SpO2 96%   BMI 32.63 kg/m²     Wt Readings from Last 3 Encounters:   22 214 lb 9.6 oz (97.3 kg)   22 217 lb 6.4 oz (98.6 kg)   22 216 lb (98 kg)      Patient was seen taking COVID-19 precautions. Face mask, gloves were used. Patient also wore facemask. GENERAL: - Alert, oriented, pleasant, in no apparent distress. HEENT: - Conjunctiva pink, no scleral icterus. ENT clear. NECK: -Supple. No jugular venous distention noted.  No masses felt,  CARDIOVASCULAR: - Normal S1 and S2    PULMONARY: - No respiratory distress. No wheezes or rales. ABDOMEN: - Soft and non-tender,no masses  Ororganomegaly. Abdomen nondistended. Bowel sounds are present. EXTREMITIES: - No cyanosis, clubbing, or significant edema. SKIN: Skin is warm and dry. NEUROLOGICAL: - Cranial nerves II through XII are grossly intact. CT abdomen and pelvis done on 3/20/2022  Impression   1. Small-bowel obstruction with discrete transition point in the right   hemiabdomen. 2. Trace ascites with new peritoneal thickening and faint peritoneal   nodularity, indeterminate but raises the possibility of peritoneal   carcinomatosis of unknown primary.  Findings can also be seen in atypical   peritonitis. 3. Borderline wall thickening with faint adjacent fat stranding.  Recommend   correlation with urinalysis. 4. Cholelithiasis. 5. Short segment of luminal narrowing at the rectosigmoid junction measuring   3 cm without measurable mass lesion.  Consider further evaluation with   flexible sigmoidoscopy if this has not been recently performed.               IMPRESSION:    Encounter Diagnoses   Name Primary?  Other pulmonary embolism without acute cor pulmonale, unspecified chronicity (HCC) Yes    Elevated PSA     Lung nodule, solitary     Elevated factor VIII level     SBO (small bowel obstruction) (HCC)     Abnormal CT of the abdomen        ASSESSMENT/PLAN:    Abnormal CT of the abdomen    3/20/2022: CT abdomen/pelvis showed SBO. Trace ascites, peritoneal nodularity and thickening rule out carcinomatosis of unknown primary or atypical peritonitis. Cholelithiasis, narrowing of rectosigmoid junction 1 to 3 cm mass lesion  Discussed all the findings with the patient. He declined any further work-up. Understands the consequences that we can be missing serious stuff including cancer but he still declines colonoscopy or surgical consult    Pulmonary embolism ([address]     9/30/17 : Unprovoked. No DVT both legs.   Factor VIII elevation. Sent to him on Dr. Fabiola Harmon for recommendations. He recommended prolonged anticoagulation as it is unprovoked pulmonary embolism. Patient is on Xarelto 10 mg daily    Elevated PSA    Patient referred to urologist.  Recommended biopsy but patient refused. Patient does not want to see another urologist or follow-up . Refused follow-up PSA also. Small bowel obstruction (HCC)    Small bowel obstruction resolved by conservative treatment    Discussed with patient in detail explaining all the findings and recommended to get further work-up for abnormal findings of the CT scan of the abdomen. He needs to see a GI and also a surgeon. There was question of carcinomatosis. Patient verbalized understanding but is still he declined. Asked patient if he discussed with his wife the finding. Patient states he did after the discharge from the hospital and she notes it. Patient does not want any work-up on PSA also    Advised patient to get a UA done which she agreed but is unable to give a urine sample. Patient states he will come back later for urinalysis. Return to office in 3 months but patient does not want to follow until 6 months      Mediations reviewed with the patient. Continue current medications. Appropriate prescriptions are addressed. After visit summeryprovided. Follow up as directed sooner if needed. Questions answered and patient verbalizes understanding. Call for any problems, questions, or concerns. No Known Allergies  Current Outpatient Medications   Medication Sig Dispense Refill    XARELTO 10 MG TABS tablet TAKE 1 TABLET BY MOUTH EVERY DAY 90 tablet 1     No current facility-administered medications for this visit. Past Medical History:   Diagnosis Date    Ankle fracture     treated with cast    Bowel obstruction (HCC)     Due to ileus.     Chest pain 7/2012    admitted in hospital MI r/o, pt refused stress test.    Elevated factor VIII level 11/20/2017    Elevated PSA 10/10/2017    Histoplasmosis     lungs. Seen  pulmonologist lung Bx    Lung nodule, solitary 11/14/2017    CT chest 9/2017: Right middle lobe 0.6 cm nodule. F/u CT chest in 12 months    Pulmonary embolism ([address] 9/30/2017    Unprovoked. No DVT both legs. Factor VIII elevation. Sent to him on Dr. Sue Person for recommendations. He recommended prolonged anticoagulation as it is unprovoked pulmonary embolism     Past Surgical History:   Procedure Laterality Date    ABDOMEN SURGERY      exploratory for obstruction. No blockage.  No resection    COLONOSCOPY  2013    pt thinks it was in 2013 by Dr Bahman Garcia in [address] COLONOSCOPY N/A 4/25/2022    COLONOSCOPY POLYPECTOMY SNARE/COLD BIOPSY performed by Roel Justin DO at [facility] ENDOSCOPY     Social History     Tobacco Use    Smoking status: Never Smoker    Smokeless tobacco: Never Used   Substance Use Topics    Alcohol use: No       LAB REVIEW:  CBC:   Lab Results   Component Value Date    WBC 8.7 03/30/2022    HGB 14.8 03/30/2022    HCT 43.7 03/30/2022     03/30/2022     Lipids:   Lab Results   Component Value Date    HDL 42 12/29/2020    LDLDIRECT 102 (H) 12/29/2020    TRIGLYCFAST 80 12/29/2020    CHOLFAST 157 12/29/2020     Renal:   Lab Results   Component Value Date    BUN 15 03/30/2022    CREATININE 0.7 03/30/2022     03/30/2022    K 4.0 03/30/2022    ALT 14 03/28/2022    AST 18 03/28/2022    GLUCOSE 99 03/30/2022    GLUF 105 12/29/2020     PT/INR:   Lab Results   Component Value Date    INR 1.14 09/30/2017     A1C: No results found for: Alda Rubin MD, 5/31/2022 , 4:37 PM

## 2022-06-03 ENCOUNTER — NURSE ONLY (OUTPATIENT)
Dept: INTERNAL MEDICINE CLINIC | Age: 67
End: 2022-06-03
Payer: COMMERCIAL

## 2022-06-03 DIAGNOSIS — R35.0 FREQUENCY OF URINATION: Primary | ICD-10-CM

## 2022-06-03 LAB
BILIRUBIN, POC: NORMAL
BLOOD URINE, POC: NORMAL
CLARITY, POC: CLEAR
COLOR, POC: YELLOW
GLUCOSE URINE, POC: NORMAL
KETONES, POC: NORMAL
LEUKOCYTE EST, POC: NORMAL
NITRITE, POC: NORMAL
PH, POC: 6.5
PROTEIN, POC: NORMAL
SPECIFIC GRAVITY, POC: 1.02
UROBILINOGEN, POC: 1

## 2022-06-03 PROCEDURE — 81002 URINALYSIS NONAUTO W/O SCOPE: CPT | Performed by: INTERNAL MEDICINE

## 2022-06-03 PROCEDURE — 81000 URINALYSIS NONAUTO W/SCOPE: CPT | Performed by: INTERNAL MEDICINE

## 2022-07-15 DIAGNOSIS — I26.99 OTHER PULMONARY EMBOLISM WITHOUT ACUTE COR PULMONALE, UNSPECIFIED CHRONICITY (HCC): ICD-10-CM

## 2022-07-15 RX ORDER — RIVAROXABAN 10 MG/1
TABLET, FILM COATED ORAL
Qty: 90 TABLET | Refills: 1 | Status: SHIPPED | OUTPATIENT
Start: 2022-07-15

## 2022-08-03 ENCOUNTER — TELEMEDICINE (OUTPATIENT)
Dept: INTERNAL MEDICINE CLINIC | Age: 67
End: 2022-08-03
Payer: COMMERCIAL

## 2022-08-03 ENCOUNTER — TELEPHONE (OUTPATIENT)
Dept: INTERNAL MEDICINE CLINIC | Age: 67
End: 2022-08-03

## 2022-08-03 DIAGNOSIS — U07.1 COVID-19 VIRUS INFECTION: ICD-10-CM

## 2022-08-03 DIAGNOSIS — I26.99 OTHER PULMONARY EMBOLISM WITHOUT ACUTE COR PULMONALE, UNSPECIFIED CHRONICITY (HCC): Primary | ICD-10-CM

## 2022-08-03 PROCEDURE — 99213 OFFICE O/P EST LOW 20 MIN: CPT | Performed by: INTERNAL MEDICINE

## 2022-08-03 PROCEDURE — 1123F ACP DISCUSS/DSCN MKR DOCD: CPT | Performed by: INTERNAL MEDICINE

## 2022-08-03 NOTE — TELEPHONE ENCOUNTER
----- Message from Charity Raymond sent at 8/3/2022  7:49 AM EDT -----  Subject: Message to Provider    QUESTIONS  Information for Provider? Pt tested positive on Sunday for Covid. Today he   has an extremely sore throat. Please call pt to advise what he can do.  ---------------------------------------------------------------------------  --------------  Rina Browne INFO  [phone]; OK to leave message on voicemail  ---------------------------------------------------------------------------  --------------  SCRIPT ANSWERS  Relationship to Patient?  Self

## 2022-08-03 NOTE — PROGRESS NOTES
8/3/2022    TELEHEALTH EVALUATION -- Audio/Visual (During BFCYE-58 public health emergency)    HPI:    Turner Luque (:  1955) has requested an audio/video evaluation for the following concern(s):    Chief Complaint   Patient presents with    Pharyngitis    Cough     Nonproductive      Diarrhea    Other     Tested positive for covid  and Monday (home test) and symptoms started 2022 and getting worse     Patient states he had mild cough last  and he tested himself for COVID and it was weakly positive. Next day on seven 3122-week he tested again it was strongly positive  Patient had cough which was nonproductive complaint of sore throat and also has some diarrhea. No abdominal pain. No nausea or vomiting. No shortness of breath. His O2 saturation was 95%. He was afebrile. Denies any headaches  Patient has a history of pulmonary embolism and is on Xarelto. Review of Systems    Prior to Visit Medications    Medication Sig Taking?  Authorizing Provider   XARELTO 10 MG TABS tablet TAKE 1 TABLET BY MOUTH EVERY DAY  Yakelin Alston MD       Social History     Tobacco Use    Smoking status: Never    Smokeless tobacco: Never   Vaping Use    Vaping Use: Never used   Substance Use Topics    Alcohol use: No    Drug use: No        No Known Allergies    PHYSICAL EXAMINATION:  [ INSTRUCTIONS:  \"[x]\" Indicates a positive item  \"[]\" Indicates a negative item  -- DELETE ALL ITEMS NOT EXAMINED]  Vital Signs: (As obtained by patient/caregiver or practitioner observation)    Blood pressure-  Heart rate-    Respiratory rate-    Temperature-  Pulse oximetry-95%    Constitutional: [x] Appears well-developed and well-nourished [x] No apparent distress      [] Abnormal-   Mental status  [x] Alert and awake  [x] Oriented to person/place/time [x]Able to follow commands      Eyes:  EOM    []  Normal  [] Abnormal-  Sclera  []  Normal  [] Abnormal -         Discharge []  None visible  [] Abnormal -    HENT: [] Normocephalic, atraumatic. [] Abnormal   [] Mouth/Throat: Mucous membranes are moist.     External Ears [] Normal  [] Abnormal-     Neck: [] No visualized mass     Pulmonary/Chest: [x] Respiratory effort normal.  [x] No visualized signs of difficulty breathing or respiratory distress        [] Abnormal-      Musculoskeletal:   [] Normal gait with no signs of ataxia         [] Normal range of motion of neck        [] Abnormal-       Neurological:        [] No Facial Asymmetry (Cranial nerve 7 motor function) (limited exam to video visit)          [] No gaze palsy        [] Abnormal-         Skin:        [] No significant exanthematous lesions or discoloration noted on facial skin         [] Abnormal-            Psychiatric:       [x] Normal Affect [x] No Hallucinations        [] Abnormal-     Other pertinent observable physical exam findings-     ASSESSMENT/PLAN:  1. COVID-19 virus infection  Patient is positive for COVID infection. He has a sore throat cough not feeling well. His O2 saturation is 95%. Discussed the treatment plan. Not a candidate for Paxlovid as patient is taking Xarelto. Discussed about infusion  Patient agreed for that and will send the request to the infusion clinic. Advised patient to take Tylenol if fever and Robitussin for cough if the symptoms get worse and if the O2 saturation drops below 90% go to the emergency room. Patient verbalized understanding. Discussed with patient's wife also. 2. Other pulmonary embolism without acute cor pulmonale, unspecified chronicity ([facility]  Patient is on Xarelto for pulmonary embolism and is stablle    History of elevated PSA but patient refused further work-up    Keep follow-up appointment and the symptoms get worse call        Sari Begun, was evaluated through a synchronous (real-time) audio-video encounter. The patient (or guardian if applicable) is aware that this is a billable service, which includes applicable co-pays.  This Virtual Visit was conducted with patient's (and/or legal guardian's) consent. The visit was conducted pursuant to the emergency declaration under the [facility], [address] [place] [address] authority and the [employer] Act. Patient identification was verified, and a caregiver was present when appropriate. The patient was located at Home: [address] [place] [address]. Provider was located at [facility] (Appt Dept): [address] [place] [address]. [address] [place] [address],  [address] [place] [address]. Total time spent on this encounter: Not billed by time    --Argelia Santos MD on 8/3/2022 at 7:19 PM    An electronic signature was used to authenticate this note.

## 2022-11-30 ENCOUNTER — OFFICE VISIT (OUTPATIENT)
Dept: INTERNAL MEDICINE CLINIC | Age: 67
End: 2022-11-30
Payer: COMMERCIAL

## 2022-11-30 VITALS
SYSTOLIC BLOOD PRESSURE: 132 MMHG | TEMPERATURE: 97 F | OXYGEN SATURATION: 96 % | RESPIRATION RATE: 16 BRPM | DIASTOLIC BLOOD PRESSURE: 64 MMHG | BODY MASS INDEX: 33.27 KG/M2 | WEIGHT: 218.8 LBS | HEART RATE: 84 BPM

## 2022-11-30 DIAGNOSIS — I26.99 OTHER PULMONARY EMBOLISM WITHOUT ACUTE COR PULMONALE, UNSPECIFIED CHRONICITY (HCC): Primary | ICD-10-CM

## 2022-11-30 DIAGNOSIS — R91.1 LUNG NODULE, SOLITARY: ICD-10-CM

## 2022-11-30 DIAGNOSIS — R79.1 ELEVATED FACTOR VIII LEVEL: ICD-10-CM

## 2022-11-30 DIAGNOSIS — R97.20 ELEVATED PSA: ICD-10-CM

## 2022-11-30 DIAGNOSIS — R93.5 ABNORMAL CT OF THE ABDOMEN: ICD-10-CM

## 2022-11-30 PROCEDURE — 99213 OFFICE O/P EST LOW 20 MIN: CPT | Performed by: INTERNAL MEDICINE

## 2022-11-30 PROCEDURE — 1123F ACP DISCUSS/DSCN MKR DOCD: CPT | Performed by: INTERNAL MEDICINE

## 2022-11-30 ASSESSMENT — PATIENT HEALTH QUESTIONNAIRE - PHQ9
2. FEELING DOWN, DEPRESSED OR HOPELESS: 0
SUM OF ALL RESPONSES TO PHQ9 QUESTIONS 1 & 2: 0
SUM OF ALL RESPONSES TO PHQ QUESTIONS 1-9: 0
1. LITTLE INTEREST OR PLEASURE IN DOING THINGS: 0
SUM OF ALL RESPONSES TO PHQ QUESTIONS 1-9: 0

## 2022-11-30 NOTE — PROGRESS NOTES
[facility]  Patient's  is 1955  Seen in office on 2022      SUBJECTIVE:  Ailyn hsieh 78 y. o.year old male presents today   Chief Complaint   Patient presents with    6 Month Follow-Up     Patient is here with his wife  Patient has history of unprovoked pulmonary embolism and elevated factor VIII. Patient is on Xarelto and is doing fairly well. No bruising or bleeding. Patient denies any chest pain. No swelling of the legs. No cough or sputum production. Patient had abnormal CAT scan of the abdomen with some nodules in the peritoneum and patient has seen gastroenterologist had colonoscopy that was negative. Dr. Tita Ovalles recommended patient to get repeat CT scan of the abdomen or PET scan but patient declined. He understands the consequences. He still declining to get a follow-up CAT scan. Patient is here with his wife and she states it is up to him  Patient denies any chest pain. No shortness of breath no cough or sputum production. No abdominal pain. No nausea vomiting or diarrhea. He had a colonoscopy done and the narrowing of the rectosigmoid area was normal.  Patient denies any melena or hematochezia    Review of Systems  Review of system normal except as in HPI  OBJECTIVE: /64   Pulse 84   Temp 97 °F (36.1 °C) (Temporal)   Resp 16   Wt 218 lb 12.8 oz (99.2 kg)   SpO2 96%   BMI 33.27 kg/m²     Wt Readings from Last 3 Encounters:   22 218 lb 12.8 oz (99.2 kg)   22 214 lb 9.6 oz (97.3 kg)   22 217 lb 6.4 oz (98.6 kg)      GENERAL: - Alert, oriented, pleasant, in no apparent distress. HEENT: - Conjunctiva pink, no scleral icterus. ENT clear. NECK: -Supple. No jugular venous distention noted. No masses felt,  CARDIOVASCULAR: - Normal S1 and S2    PULMONARY: - No respiratory distress. No wheezes or rales. ABDOMEN: - Soft and non-tender,no masses  ororganomegaly. EXTREMITIES: - No cyanosis, clubbing, or significant edema.   SKIN: Skin is warm and dry.   NEUROLOGICAL: - Cranial nerves II through XII are grossly intact. IMPRESSION:    Encounter Diagnoses   Name Primary? Other pulmonary embolism without acute cor pulmonale, unspecified chronicity (HCC) Yes    Elevated PSA     Abnormal CT of the abdomen     Lung nodule, solitary     Elevated factor VIII level        ASSESSMENT/PLAN:    1. Other pulmonary embolism without acute cor pulmonale, unspecified chronicity ([address]  Overview:   9/30/17 : Unprovoked. No DVT both legs. Factor VIII elevation. Sent to him on Dr. Arian Rubio for recommendations. He recommended prolonged anticoagulation as it is unprovoked pulmonary embolism. Patient is on Xarelto 10 mg daily  Patient has seen Dr. Grace Cano also and he also recommended prolonged anticoagulation. 2. Elevated PSA  Overview:  Patient referred to urologist.  Recommended biopsy but patient refused. Patient does not want to see another urologist or follow-up . Refused follow-up PSA also. Patient understands the consequences  3. Abnormal CT of the abdomen  Overview:  3/20/2022: CT abdomen/pelvis showed SBO. Trace ascites, peritoneal nodularity and thickening rule out carcinomatosis of unknown primary or atypical peritonitis. Cholelithiasis, narrowing of rectosigmoid junction 1 to 3 cm mass lesion  Discussed all the findings with the patient. He declined any further work-up. Understands the consequences that we can be missing serious stuff including cancer but he still declines follow-up. Seen Dr. Grace Cano but he declined PET scan a follow-up CT scan. He did had colonoscopy for narrowing of the rectosigmoid junction which was widely patent. Assessment & Plan:  Discussed again with the patient and his wife. Again they declined     4. Lung nodule, solitary  Overview:  CT chest 9/2017: Right middle lobe 0.6 cm nodule. F/u CT chest in 12 months  Follow-up CT scan in 2018: Stable for 18 months considered benign  5.  Elevated factor VIII level  Overview:  Pt had elevated     Return to office in 3 months. Mediations reviewed with the patient. Continue current medications. Appropriate prescriptions are addressed. After visit summeryprovided. Follow up as directed sooner if needed. Questions answered and patient verbalizes understanding. Call for any problems, questions, or concerns. No Known Allergies  Current Outpatient Medications   Medication Sig Dispense Refill    XARELTO 10 MG TABS tablet TAKE 1 TABLET BY MOUTH EVERY DAY 90 tablet 1     No current facility-administered medications for this visit. Past Medical History:   Diagnosis Date    Ankle fracture     treated with cast    Bowel obstruction (HCC)     Due to ileus. Chest pain 7/2012    admitted in hospital MI r/o, pt refused stress test.    Elevated factor VIII level 11/20/2017    Elevated PSA 10/10/2017    Histoplasmosis     lungs. Seen  pulmonologist lung Bx    Lung nodule, solitary 11/14/2017    CT chest 9/2017: Right middle lobe 0.6 cm nodule. F/u CT chest in 12 months    Pulmonary embolism ([address] 9/30/2017    Unprovoked. No DVT both legs. Factor VIII elevation. Sent to him on Dr. Delbert Youssef for recommendations. He recommended prolonged anticoagulation as it is unprovoked pulmonary embolism     Past Surgical History:   Procedure Laterality Date    ABDOMEN SURGERY      exploratory for obstruction. No blockage.  No resection    COLONOSCOPY  2013    pt thinks it was in 2013 by Dr Jai Tobar in [facility].    COLONOSCOPY N/A 4/25/2022    COLONOSCOPY POLYPECTOMY SNARE/COLD BIOPSY performed by Gudelia Russell DO at [address] [place] History     Tobacco Use    Smoking status: Never    Smokeless tobacco: Never   Substance Use Topics    Alcohol use: No       LAB REVIEW:  CBC:   Lab Results   Component Value Date/Time    WBC 8.7 03/30/2022 09:53 AM    HGB 14.8 03/30/2022 09:53 AM    HCT 43.7 03/30/2022 09:53 AM     03/30/2022 09:53 AM     Lipids:   Lab Results   Component Value Date    HDL 42 12/29/2020    LDLDIRECT 102 (H) 12/29/2020    TRIGLYCFAST 80 12/29/2020    CHOLFAST 157 12/29/2020     Renal:   Lab Results   Component Value Date/Time    BUN 15 03/30/2022 09:53 AM    CREATININE 0.7 03/30/2022 09:53 AM     03/30/2022 09:53 AM    K 4.0 03/30/2022 09:53 AM    ALT 14 03/28/2022 03:00 PM    AST 18 03/28/2022 03:00 PM    GLUCOSE 99 03/30/2022 09:53 AM    GLUF 105 12/29/2020 09:30 AM     PT/INR:   Lab Results   Component Value Date/Time    INR 1.14 09/30/2017 11:35 AM     A1C: No results found for: Morenita Gann MD, 11/30/2022 , 5:07 PM

## 2023-01-15 DIAGNOSIS — I26.99 OTHER PULMONARY EMBOLISM WITHOUT ACUTE COR PULMONALE, UNSPECIFIED CHRONICITY (HCC): ICD-10-CM

## 2023-01-16 RX ORDER — RIVAROXABAN 10 MG/1
TABLET, FILM COATED ORAL
Qty: 90 TABLET | Refills: 1 | Status: SHIPPED | OUTPATIENT
Start: 2023-01-16

## 2023-05-24 ENCOUNTER — OFFICE VISIT (OUTPATIENT)
Dept: INTERNAL MEDICINE CLINIC | Age: 68
End: 2023-05-24
Payer: COMMERCIAL

## 2023-05-24 VITALS
DIASTOLIC BLOOD PRESSURE: 64 MMHG | WEIGHT: 226.2 LBS | SYSTOLIC BLOOD PRESSURE: 138 MMHG | TEMPERATURE: 98.8 F | BODY MASS INDEX: 34.28 KG/M2 | HEIGHT: 68 IN | HEART RATE: 72 BPM | RESPIRATION RATE: 16 BRPM | OXYGEN SATURATION: 95 %

## 2023-05-24 DIAGNOSIS — R97.20 ELEVATED PSA: ICD-10-CM

## 2023-05-24 DIAGNOSIS — R93.5 ABNORMAL CT OF THE ABDOMEN: ICD-10-CM

## 2023-05-24 DIAGNOSIS — I26.99 OTHER PULMONARY EMBOLISM WITHOUT ACUTE COR PULMONALE, UNSPECIFIED CHRONICITY (HCC): Primary | ICD-10-CM

## 2023-05-24 DIAGNOSIS — Z00.00 HEALTHCARE MAINTENANCE: ICD-10-CM

## 2023-05-24 PROBLEM — K56.609 SMALL BOWEL OBSTRUCTION (HCC): Status: RESOLVED | Noted: 2022-03-28 | Resolved: 2023-05-24

## 2023-05-24 PROBLEM — U07.1 COVID-19 VIRUS INFECTION: Status: RESOLVED | Noted: 2022-08-03 | Resolved: 2023-05-24

## 2023-05-24 PROCEDURE — 1123F ACP DISCUSS/DSCN MKR DOCD: CPT | Performed by: INTERNAL MEDICINE

## 2023-05-24 PROCEDURE — 99213 OFFICE O/P EST LOW 20 MIN: CPT | Performed by: INTERNAL MEDICINE

## 2023-05-24 RX ORDER — RIVAROXABAN 10 MG/1
10 TABLET, FILM COATED ORAL DAILY
Qty: 90 TABLET | Refills: 1 | Status: SHIPPED | OUTPATIENT
Start: 2023-05-24

## 2023-05-24 ASSESSMENT — PATIENT HEALTH QUESTIONNAIRE - PHQ9
SUM OF ALL RESPONSES TO PHQ QUESTIONS 1-9: 0
2. FEELING DOWN, DEPRESSED OR HOPELESS: 0
SUM OF ALL RESPONSES TO PHQ QUESTIONS 1-9: 0

## 2023-05-24 NOTE — PROGRESS NOTES
Diaz Ayala  Patient's  is 1955  Seen in office on 2023      SUBJECTIVE:  Clara Carmona jori 80 y. o.year old male presents today   Chief Complaint   Patient presents with    Follow-up    Medication Refill     Pt is here for f/u of unprovoked pulmonary embolism for which he is on Xarelto. Patient denies any bleeding or bruising. He is taking medication regularly. No chest pain and no shortness breath. Patient had peritoneal nodularity on CT scan of the abdomen. Patient declined PET scan and he does not want any further work-up. He understands the consequences. He has elevated PSA also and again he declined follow-up PSA and urologist referral    Taking medications regularly. No side effects noted. Review of Systems  Review of system normal except as in HPI  OBJECTIVE: /64   Pulse 72   Temp 98.8 °F (37.1 °C) (Temporal)   Resp 16   Ht 5' 8\" (1.727 m)   Wt 226 lb 3.2 oz (102.6 kg)   SpO2 95%   BMI 34.39 kg/m²     Wt Readings from Last 3 Encounters:   23 226 lb 3.2 oz (102.6 kg)   22 218 lb 12.8 oz (99.2 kg)   22 214 lb 9.6 oz (97.3 kg)      GENERAL: - Alert, oriented, pleasant, in no apparent distress. HEENT: - Conjunctiva pink, no scleral icterus. ENT clear. NECK: -Supple. No jugular venous distention noted. No masses felt,  CARDIOVASCULAR: - Normal S1 and S2    PULMONARY: - No respiratory distress. No wheezes or rales. ABDOMEN: - Soft and non-tender,no masses  ororganomegaly. EXTREMITIES: - No cyanosis, clubbing, or significant edema. SKIN: Skin is warm and dry. NEUROLOGICAL: - Cranial nerves II through XII are grossly intact. IMPRESSION:    Encounter Diagnoses   Name Primary? Other pulmonary embolism without acute cor pulmonale, unspecified chronicity (HCC) Yes    Elevated PSA     Abnormal CT of the abdomen        ASSESSMENT/PLAN:    1.  Other pulmonary embolism without acute cor pulmonale, unspecified chronicity ([address]  Overview:   17 :

## 2023-06-23 PROBLEM — Z00.00 HEALTHCARE MAINTENANCE: Status: RESOLVED | Noted: 2023-05-24 | Resolved: 2023-06-23

## 2023-11-24 SDOH — ECONOMIC STABILITY: FOOD INSECURITY: WITHIN THE PAST 12 MONTHS, THE FOOD YOU BOUGHT JUST DIDN'T LAST AND YOU DIDN'T HAVE MONEY TO GET MORE.: NEVER TRUE

## 2023-11-24 SDOH — ECONOMIC STABILITY: TRANSPORTATION INSECURITY
IN THE PAST 12 MONTHS, HAS LACK OF TRANSPORTATION KEPT YOU FROM MEETINGS, WORK, OR FROM GETTING THINGS NEEDED FOR DAILY LIVING?: NO

## 2023-11-24 SDOH — ECONOMIC STABILITY: INCOME INSECURITY: HOW HARD IS IT FOR YOU TO PAY FOR THE VERY BASICS LIKE FOOD, HOUSING, MEDICAL CARE, AND HEATING?: NOT HARD AT ALL

## 2023-11-24 SDOH — ECONOMIC STABILITY: HOUSING INSECURITY
IN THE LAST 12 MONTHS, WAS THERE A TIME WHEN YOU DID NOT HAVE A STEADY PLACE TO SLEEP OR SLEPT IN A SHELTER (INCLUDING NOW)?: NO

## 2023-11-24 SDOH — ECONOMIC STABILITY: FOOD INSECURITY: WITHIN THE PAST 12 MONTHS, YOU WORRIED THAT YOUR FOOD WOULD RUN OUT BEFORE YOU GOT MONEY TO BUY MORE.: NEVER TRUE

## 2023-11-27 ENCOUNTER — OFFICE VISIT (OUTPATIENT)
Dept: INTERNAL MEDICINE CLINIC | Age: 68
End: 2023-11-27
Payer: COMMERCIAL

## 2023-11-27 VITALS
DIASTOLIC BLOOD PRESSURE: 80 MMHG | BODY MASS INDEX: 34.21 KG/M2 | SYSTOLIC BLOOD PRESSURE: 156 MMHG | HEART RATE: 66 BPM | TEMPERATURE: 97.9 F | RESPIRATION RATE: 16 BRPM | WEIGHT: 225 LBS | OXYGEN SATURATION: 95 %

## 2023-11-27 DIAGNOSIS — R93.5 ABNORMAL CT OF THE ABDOMEN: ICD-10-CM

## 2023-11-27 DIAGNOSIS — R97.20 ELEVATED PSA: ICD-10-CM

## 2023-11-27 DIAGNOSIS — I26.99 OTHER PULMONARY EMBOLISM WITHOUT ACUTE COR PULMONALE, UNSPECIFIED CHRONICITY (HCC): Primary | ICD-10-CM

## 2023-11-27 DIAGNOSIS — Z28.21 VACCINATION REFUSED BY PATIENT: ICD-10-CM

## 2023-11-27 PROCEDURE — 1123F ACP DISCUSS/DSCN MKR DOCD: CPT | Performed by: INTERNAL MEDICINE

## 2023-11-27 PROCEDURE — 99213 OFFICE O/P EST LOW 20 MIN: CPT | Performed by: INTERNAL MEDICINE

## 2023-11-27 RX ORDER — RIVAROXABAN 10 MG/1
10 TABLET, FILM COATED ORAL DAILY
Qty: 90 TABLET | Refills: 1 | Status: SHIPPED | OUTPATIENT
Start: 2023-11-27

## 2023-11-27 ASSESSMENT — PATIENT HEALTH QUESTIONNAIRE - PHQ9
2. FEELING DOWN, DEPRESSED OR HOPELESS: 0
SUM OF ALL RESPONSES TO PHQ QUESTIONS 1-9: 0
SUM OF ALL RESPONSES TO PHQ QUESTIONS 1-9: 0
1. LITTLE INTEREST OR PLEASURE IN DOING THINGS: 0
SUM OF ALL RESPONSES TO PHQ QUESTIONS 1-9: 0
SUM OF ALL RESPONSES TO PHQ QUESTIONS 1-9: 0
SUM OF ALL RESPONSES TO PHQ9 QUESTIONS 1 & 2: 0

## 2023-11-27 ASSESSMENT — ENCOUNTER SYMPTOMS
EYES NEGATIVE: 1
RESPIRATORY NEGATIVE: 1
GASTROINTESTINAL NEGATIVE: 1
ALLERGIC/IMMUNOLOGIC NEGATIVE: 1

## 2024-05-28 ENCOUNTER — OFFICE VISIT (OUTPATIENT)
Age: 69
End: 2024-05-28
Payer: MEDICARE

## 2024-05-28 VITALS
TEMPERATURE: 97 F | DIASTOLIC BLOOD PRESSURE: 72 MMHG | OXYGEN SATURATION: 96 % | WEIGHT: 220.6 LBS | BODY MASS INDEX: 33.54 KG/M2 | RESPIRATION RATE: 16 BRPM | SYSTOLIC BLOOD PRESSURE: 138 MMHG | HEART RATE: 62 BPM

## 2024-05-28 DIAGNOSIS — R97.20 ELEVATED PSA: ICD-10-CM

## 2024-05-28 DIAGNOSIS — I26.99 OTHER PULMONARY EMBOLISM WITHOUT ACUTE COR PULMONALE, UNSPECIFIED CHRONICITY (HCC): Primary | ICD-10-CM

## 2024-05-28 DIAGNOSIS — R93.5 ABNORMAL CT OF THE ABDOMEN: ICD-10-CM

## 2024-05-28 DIAGNOSIS — Z28.21 VACCINATION REFUSED BY PATIENT: ICD-10-CM

## 2024-05-28 PROCEDURE — 1036F TOBACCO NON-USER: CPT | Performed by: INTERNAL MEDICINE

## 2024-05-28 PROCEDURE — G8417 CALC BMI ABV UP PARAM F/U: HCPCS | Performed by: INTERNAL MEDICINE

## 2024-05-28 PROCEDURE — G8428 CUR MEDS NOT DOCUMENT: HCPCS | Performed by: INTERNAL MEDICINE

## 2024-05-28 PROCEDURE — 99213 OFFICE O/P EST LOW 20 MIN: CPT | Performed by: INTERNAL MEDICINE

## 2024-05-28 PROCEDURE — 1123F ACP DISCUSS/DSCN MKR DOCD: CPT | Performed by: INTERNAL MEDICINE

## 2024-05-28 PROCEDURE — 3017F COLORECTAL CA SCREEN DOC REV: CPT | Performed by: INTERNAL MEDICINE

## 2024-05-28 RX ORDER — RIVAROXABAN 10 MG/1
10 TABLET, FILM COATED ORAL DAILY
Qty: 90 TABLET | Refills: 1 | Status: SHIPPED | OUTPATIENT
Start: 2024-05-28

## 2024-05-28 NOTE — PROGRESS NOTES
Augustine Del Toro  Patient's  is 1955  Seen in office on 2024      SUBJECTIVE:  Augustine hsieh 69 y.o.year old male presents today   Chief Complaint   Patient presents with    6 Month Follow-Up     Patient is here for follow-up of pulmonary embolism, elevated PSA  Patient has history of unprovoked pulmonary embolism and is on Xarelto.  Patient is doing well.  No bleeding or bruising.  No chest pain or shortness of breath.  No DVT.  Patient has elevated PSA but he refused any further workup.  There was a slight drop in the level of PSA but is still it is elevated.  He was referred to urologist and he recommended biopsy but patient refused.  Patient states he is asymptomatic.    Patient has a CT scan of the abdomen in the past showed some nodularity of the peritoneum and the mass in the rectosigmoid junction but no colonoscopy showed no mass.  Recommended to get a repeat CT or PET scan but patient refused and was seen by Dr. Dennis also.  Taking medications regularly. No side effects noted.    Review of Systems  Review of system normal except as in HPI  OBJECTIVE: /72   Pulse 62   Temp 97 °F (36.1 °C) (Temporal)   Resp 16   Wt 100.1 kg (220 lb 9.6 oz)   SpO2 96%   BMI 33.54 kg/m²     Wt Readings from Last 3 Encounters:   24 100.1 kg (220 lb 9.6 oz)   23 102.1 kg (225 lb)   23 102.6 kg (226 lb 3.2 oz)      GENERAL: - Alert, oriented, pleasant, in no apparent distress.    HEENT: - Conjunctiva pink, no scleral icterus. ENT clear.  NECK: -Supple.  No jugular venous distention noted. No masses felt,  CARDIOVASCULAR: - Normal S1 and S2    PULMONARY: - No respiratory distress.  No wheezes or rales.    ABDOMEN: - Soft and non-tender,no masses  ororganomegaly.  EXTREMITIES: - No cyanosis, clubbing, or significant edema.  SKIN: Skin is warm and dry.   NEUROLOGICAL: - Cranial nerves II through XII are grossly intact.      IMPRESSION:    Encounter Diagnoses   Name Primary?    Other pulmonary

## 2024-11-29 SDOH — ECONOMIC STABILITY: FOOD INSECURITY: WITHIN THE PAST 12 MONTHS, THE FOOD YOU BOUGHT JUST DIDN'T LAST AND YOU DIDN'T HAVE MONEY TO GET MORE.: NEVER TRUE

## 2024-11-29 SDOH — ECONOMIC STABILITY: FOOD INSECURITY: WITHIN THE PAST 12 MONTHS, YOU WORRIED THAT YOUR FOOD WOULD RUN OUT BEFORE YOU GOT MONEY TO BUY MORE.: NEVER TRUE

## 2024-11-29 SDOH — ECONOMIC STABILITY: INCOME INSECURITY: HOW HARD IS IT FOR YOU TO PAY FOR THE VERY BASICS LIKE FOOD, HOUSING, MEDICAL CARE, AND HEATING?: NOT HARD AT ALL

## 2024-12-02 ENCOUNTER — OFFICE VISIT (OUTPATIENT)
Age: 69
End: 2024-12-02
Payer: MEDICARE

## 2024-12-02 VITALS
OXYGEN SATURATION: 98 % | WEIGHT: 227 LBS | BODY MASS INDEX: 34.4 KG/M2 | HEART RATE: 64 BPM | SYSTOLIC BLOOD PRESSURE: 146 MMHG | DIASTOLIC BLOOD PRESSURE: 80 MMHG | HEIGHT: 68 IN | TEMPERATURE: 97.9 F | RESPIRATION RATE: 16 BRPM

## 2024-12-02 DIAGNOSIS — R93.5 ABNORMAL CT OF THE ABDOMEN: ICD-10-CM

## 2024-12-02 DIAGNOSIS — I26.99 OTHER PULMONARY EMBOLISM WITHOUT ACUTE COR PULMONALE, UNSPECIFIED CHRONICITY (HCC): Primary | ICD-10-CM

## 2024-12-02 DIAGNOSIS — R22.1 NECK MASS: ICD-10-CM

## 2024-12-02 DIAGNOSIS — Z28.21 VACCINATION REFUSED BY PATIENT: ICD-10-CM

## 2024-12-02 DIAGNOSIS — R97.20 ELEVATED PSA: ICD-10-CM

## 2024-12-02 PROCEDURE — 1036F TOBACCO NON-USER: CPT | Performed by: INTERNAL MEDICINE

## 2024-12-02 PROCEDURE — G8427 DOCREV CUR MEDS BY ELIG CLIN: HCPCS | Performed by: INTERNAL MEDICINE

## 2024-12-02 PROCEDURE — 1123F ACP DISCUSS/DSCN MKR DOCD: CPT | Performed by: INTERNAL MEDICINE

## 2024-12-02 PROCEDURE — 99213 OFFICE O/P EST LOW 20 MIN: CPT | Performed by: INTERNAL MEDICINE

## 2024-12-02 PROCEDURE — G8417 CALC BMI ABV UP PARAM F/U: HCPCS | Performed by: INTERNAL MEDICINE

## 2024-12-02 PROCEDURE — 3017F COLORECTAL CA SCREEN DOC REV: CPT | Performed by: INTERNAL MEDICINE

## 2024-12-02 PROCEDURE — 1159F MED LIST DOCD IN RCRD: CPT | Performed by: INTERNAL MEDICINE

## 2024-12-02 PROCEDURE — G8484 FLU IMMUNIZE NO ADMIN: HCPCS | Performed by: INTERNAL MEDICINE

## 2024-12-02 RX ORDER — RIVAROXABAN 10 MG/1
10 TABLET, FILM COATED ORAL DAILY
Qty: 90 TABLET | Refills: 1 | Status: SHIPPED | OUTPATIENT
Start: 2024-12-02

## 2024-12-02 SDOH — ECONOMIC STABILITY: INCOME INSECURITY: HOW HARD IS IT FOR YOU TO PAY FOR THE VERY BASICS LIKE FOOD, HOUSING, MEDICAL CARE, AND HEATING?: NOT VERY HARD

## 2024-12-02 SDOH — ECONOMIC STABILITY: FOOD INSECURITY: WITHIN THE PAST 12 MONTHS, THE FOOD YOU BOUGHT JUST DIDN'T LAST AND YOU DIDN'T HAVE MONEY TO GET MORE.: NEVER TRUE

## 2024-12-02 SDOH — ECONOMIC STABILITY: FOOD INSECURITY: WITHIN THE PAST 12 MONTHS, YOU WORRIED THAT YOUR FOOD WOULD RUN OUT BEFORE YOU GOT MONEY TO BUY MORE.: NEVER TRUE

## 2024-12-02 ASSESSMENT — PATIENT HEALTH QUESTIONNAIRE - PHQ9
SUM OF ALL RESPONSES TO PHQ QUESTIONS 1-9: 0
SUM OF ALL RESPONSES TO PHQ9 QUESTIONS 1 & 2: 0
1. LITTLE INTEREST OR PLEASURE IN DOING THINGS: NOT AT ALL
2. FEELING DOWN, DEPRESSED OR HOPELESS: NOT AT ALL
SUM OF ALL RESPONSES TO PHQ QUESTIONS 1-9: 0

## 2024-12-02 NOTE — PROGRESS NOTES
Augustine Del Toro  Patient's  is 1955  Seen in office on 2024      SUBJECTIVE:  Augustine hsieh 69 y.o.year old male presents today   Chief Complaint   Patient presents with    6 Month Follow-Up    Results     Lab review    Medication Refill     Patient is here for 6-month follow-up for pulmonary embolism, elevated PSA    Patient has history of unprovoked pulmonary embolism and has seen heme-onc also and recommended to have indefinite anticoagulation.  Patient is on Xarelto 10 mg daily and is tolerating well.  No bleeding or bruising  Patient has elevated PSA.  PSA has decreased from 5.9 to 4.91.  He denies any urinary symptoms.  He again refuses to see any urologist.  He understands the consequences of missing prostate cancer.  Patient had abnormal CT scan of the abdomen few years ago.  Recommended at that time to get a PET scan but is still here refuses to get any workup for that.  Patient denies any chest pain.  No shortness of breath no cough or sputum production.  No abdominal pain.  No nausea vomiting or diarrhea  Taking medications regularly. No side effects noted.    Review of Systems  Review of system normal except as in HPI  OBJECTIVE: BP (!) 146/80   Pulse 64   Temp 97.9 °F (36.6 °C) (Oral)   Resp 16   Ht 1.727 m (5' 8\")   Wt 103 kg (227 lb)   SpO2 98%   BMI 34.52 kg/m²     Wt Readings from Last 3 Encounters:   24 103 kg (227 lb)   24 100.1 kg (220 lb 9.6 oz)   23 102.1 kg (225 lb)      GENERAL: - Alert, oriented, pleasant, in no apparent distress.    HEENT: - Conjunctiva pink, no scleral icterus. ENT clear.  NECK: -Supple.  No jugular venous distention noted. No masses felt,  CARDIOVASCULAR: - Normal S1 and S2    PULMONARY: - No respiratory distress.  No wheezes or rales.    ABDOMEN: - Soft and non-tender,no masses  ororganomegaly.  EXTREMITIES: - No cyanosis, clubbing, or significant edema.  SKIN: Skin is warm and dry.   NEUROLOGICAL: - Cranial nerves II through XII are

## 2025-05-31 SDOH — ECONOMIC STABILITY: TRANSPORTATION INSECURITY
IN THE PAST 12 MONTHS, HAS LACK OF TRANSPORTATION KEPT YOU FROM MEETINGS, WORK, OR FROM GETTING THINGS NEEDED FOR DAILY LIVING?: PATIENT DECLINED

## 2025-05-31 SDOH — ECONOMIC STABILITY: FOOD INSECURITY: WITHIN THE PAST 12 MONTHS, THE FOOD YOU BOUGHT JUST DIDN'T LAST AND YOU DIDN'T HAVE MONEY TO GET MORE.: PATIENT DECLINED

## 2025-05-31 SDOH — ECONOMIC STABILITY: FOOD INSECURITY: WITHIN THE PAST 12 MONTHS, YOU WORRIED THAT YOUR FOOD WOULD RUN OUT BEFORE YOU GOT MONEY TO BUY MORE.: PATIENT DECLINED

## 2025-05-31 SDOH — ECONOMIC STABILITY: INCOME INSECURITY: IN THE LAST 12 MONTHS, WAS THERE A TIME WHEN YOU WERE NOT ABLE TO PAY THE MORTGAGE OR RENT ON TIME?: PATIENT DECLINED

## 2025-05-31 SDOH — ECONOMIC STABILITY: TRANSPORTATION INSECURITY
IN THE PAST 12 MONTHS, HAS THE LACK OF TRANSPORTATION KEPT YOU FROM MEDICAL APPOINTMENTS OR FROM GETTING MEDICATIONS?: PATIENT DECLINED

## 2025-06-03 ENCOUNTER — OFFICE VISIT (OUTPATIENT)
Age: 70
End: 2025-06-03
Payer: MEDICARE

## 2025-06-03 VITALS
DIASTOLIC BLOOD PRESSURE: 72 MMHG | OXYGEN SATURATION: 97 % | HEIGHT: 68 IN | HEART RATE: 74 BPM | SYSTOLIC BLOOD PRESSURE: 128 MMHG | WEIGHT: 228.8 LBS | BODY MASS INDEX: 34.68 KG/M2 | RESPIRATION RATE: 18 BRPM

## 2025-06-03 DIAGNOSIS — I26.99 OTHER PULMONARY EMBOLISM WITHOUT ACUTE COR PULMONALE, UNSPECIFIED CHRONICITY (HCC): Primary | ICD-10-CM

## 2025-06-03 DIAGNOSIS — R97.20 ELEVATED PSA: ICD-10-CM

## 2025-06-03 DIAGNOSIS — Z28.21 VACCINATION REFUSED BY PATIENT: ICD-10-CM

## 2025-06-03 PROCEDURE — 1036F TOBACCO NON-USER: CPT | Performed by: INTERNAL MEDICINE

## 2025-06-03 PROCEDURE — 1159F MED LIST DOCD IN RCRD: CPT | Performed by: INTERNAL MEDICINE

## 2025-06-03 PROCEDURE — 99213 OFFICE O/P EST LOW 20 MIN: CPT | Performed by: INTERNAL MEDICINE

## 2025-06-03 PROCEDURE — 3017F COLORECTAL CA SCREEN DOC REV: CPT | Performed by: INTERNAL MEDICINE

## 2025-06-03 PROCEDURE — G8427 DOCREV CUR MEDS BY ELIG CLIN: HCPCS | Performed by: INTERNAL MEDICINE

## 2025-06-03 PROCEDURE — 1123F ACP DISCUSS/DSCN MKR DOCD: CPT | Performed by: INTERNAL MEDICINE

## 2025-06-03 PROCEDURE — G8417 CALC BMI ABV UP PARAM F/U: HCPCS | Performed by: INTERNAL MEDICINE

## 2025-06-03 RX ORDER — RIVAROXABAN 10 MG/1
10 TABLET, FILM COATED ORAL DAILY
Qty: 90 TABLET | Refills: 1 | Status: SHIPPED | OUTPATIENT
Start: 2025-06-03

## 2025-06-03 ASSESSMENT — PATIENT HEALTH QUESTIONNAIRE - PHQ9
SUM OF ALL RESPONSES TO PHQ QUESTIONS 1-9: 0
1. LITTLE INTEREST OR PLEASURE IN DOING THINGS: NOT AT ALL
2. FEELING DOWN, DEPRESSED OR HOPELESS: NOT AT ALL

## 2025-06-03 NOTE — PROGRESS NOTES
No cyanosis, clubbing, or significant edema.  SKIN: Skin is warm and dry.   NEUROLOGICAL: - Cranial nerves II through XII are grossly intact.      IMPRESSION:    Encounter Diagnoses   Name Primary?    Other pulmonary embolism without acute cor pulmonale, unspecified chronicity (HCC) Yes    Elevated PSA     Vaccination refused by patient        ASSESSMENT/PLAN:  Assessment & Plan  1. History of pulmonary embolism.  - Currently on Xarelto 10 mg once a day.  - No recent episodes of chest pain or shortness of breath.  - No swelling of legs or ankles.  - Prescription for Xarelto 10 mg will be sent to pharmacy.    2. Elevated PSA.  - No current symptoms related to elevated PSA.  - Patient declines further action or testing for elevated PSA.  - No physical exam findings indicating progression.  - No treatment or intervention planned at this time.    3. Health maintenance.  - Blood pressure is within the normal range.  - Weight gain of 8 pounds over the past year.  - Discussed the need for blood tests before the next visit.  - Complete blood count (CBC) will be ordered to be done at [facility] before the next visit.    Orders Placed This Encounter   Procedures    CBC with Auto Differential    Comprehensive Metabolic Panel     Return to office in 3 months.    Medications were reviewed with the patient. Continue current medications.  Appropriate prescriptions were addressed. After visit summery provided.  Follow up as directed : sooner if needed.  Questions were answered and patient verbalized understanding. Call for any problems, questions, or concerns.       No Known Allergies  Current Outpatient Medications   Medication Sig Dispense Refill    XARELTO 10 MG TABS tablet Take 1 tablet by mouth daily 90 tablet 1     No current facility-administered medications for this visit.     Past Medical History:   Diagnosis Date    Ankle fracture     treated with cast    Bowel obstruction (HCC)     Due to ileus.    Chest pain 7/2012

## (undated) DEVICE — FORCEPS BX 240CM 2.4MM L NDL RAD JAW 4 M00513334